# Patient Record
Sex: FEMALE | Race: OTHER | Employment: FULL TIME | URBAN - METROPOLITAN AREA
[De-identification: names, ages, dates, MRNs, and addresses within clinical notes are randomized per-mention and may not be internally consistent; named-entity substitution may affect disease eponyms.]

---

## 2017-01-25 ENCOUNTER — TELEPHONE (OUTPATIENT)
Dept: INTERNAL MEDICINE CLINIC | Facility: CLINIC | Age: 32
End: 2017-01-25

## 2017-01-25 NOTE — TELEPHONE ENCOUNTER
Pt made appt via my chart on 02/01 c/o anxiety please advise. Patient Demographics      Address Phone E-mail Address     [address] [facility] [place] [address] [phone] [facility]) [email or web address]       Message      Appointment For: Jolynn Mae

## 2017-01-26 ENCOUNTER — OFFICE VISIT (OUTPATIENT)
Dept: OBGYN CLINIC | Facility: CLINIC | Age: 32
End: 2017-01-26

## 2017-01-26 VITALS
DIASTOLIC BLOOD PRESSURE: 76 MMHG | HEART RATE: 87 BPM | SYSTOLIC BLOOD PRESSURE: 123 MMHG | BODY MASS INDEX: 27 KG/M2 | WEIGHT: 157 LBS

## 2017-01-26 DIAGNOSIS — Z01.419 NORMAL PELVIC EXAM: Primary | ICD-10-CM

## 2017-01-26 PROCEDURE — 99213 OFFICE O/P EST LOW 20 MIN: CPT | Performed by: CLINICAL NURSE SPECIALIST

## 2017-01-26 NOTE — PROGRESS NOTES
Ian Simmons is a 33year old female [facility] Patient's last menstrual period was 12/28/2016. Patient presents with:  Gyn Problem: bulge on top of vagina and cyst at bottom  Was inserting NuvaRing and felt a \"fleshy hard bulge in the back of my vagina\".  D HCl (MUCINEX ALLERGY OR), Take by mouth., Disp: , Rfl:   •  Pseudoephedrine-APAP-DM (DAYQUIL OR), Take by mouth., Disp: , Rfl:   •  Etonogestrel-Ethinyl Estradiol (NUVARING) 0.12-0.015 MG/24HR Vaginal Ring, Place 1 each vaginally every 28 days. , Disp: 1 ea completely normal.

## 2017-01-27 NOTE — TELEPHONE ENCOUNTER
She needs to have an office visit before I feel comfortable prescribing any medication. However, please provide her with the contact information for Behavioral Health for appropriate counseling in the meantime.

## 2017-01-27 NOTE — TELEPHONE ENCOUNTER
Spoke to pt. She states that she is going through a divorce. She has been very emotional, lashing out at people, cries frequently. States that she wakes up at night with her heart racing.  She has always had anxiety but the divorce is making it \"ten times

## 2017-02-01 NOTE — PATIENT INSTRUCTIONS
Please begin generic Lexapro 10 mg 1 pill daily. Use alprazolam sparingly for acute anxiety. Return visit in one month for recheck.

## 2017-02-01 NOTE — PROGRESS NOTES
Nikki Nichols is a 33year old female. Patient presents with: Anxiety    HPI:   Ms. Titi Lomelikasia presents this afternoon to discuss medication for anxiety. She states that she has been anxious \"my whole life. \"  Recently, because of life stressors, she has be Depression with anxiety    • Migraines    • Chronic rhinitis    • Genital herpes       Social History:    Smoking Status: Never Smoker                      Smokeless Status: Never Used                        Alcohol Use: Yes           0.0 oz/week       0 S

## 2017-02-03 ENCOUNTER — TELEPHONE (OUTPATIENT)
Dept: FAMILY MEDICINE CLINIC | Facility: CLINIC | Age: 32
End: 2017-02-03

## 2017-02-03 NOTE — TELEPHONE ENCOUNTER
Please advise, Pt stated she took the 10 mg on an empty stomach and got dizzy and nauseated.  Today she took a 1/2 tablet with food and feels better no side effects,asking if ok to take 1/2 dosage

## 2017-02-03 NOTE — TELEPHONE ENCOUNTER
Okay to take generic Lexapro 10 mg one half pill daily for a few days, but I would recommend that she soon try to take a whole pill every day

## 2017-02-03 NOTE — TELEPHONE ENCOUNTER
----- Message from [facility] sent at 2/2/2017  8:18 AM CST -----  Regarding: Prescription Question  Contact: [phone]  Hi I started the lexapro yesterday. I took one this morning and I feel dizzy and nauseous.  Should I cut the pill in half and on

## 2017-05-01 RX ORDER — VALACYCLOVIR HYDROCHLORIDE 500 MG/1
TABLET, FILM COATED ORAL
Qty: 30 TABLET | Refills: 0 | Status: SHIPPED | OUTPATIENT
Start: 2017-05-01 | End: 2017-05-30

## 2017-05-01 NOTE — TELEPHONE ENCOUNTER
Received Valacyclovir rx refill (maintenance dose) request from pt's pharmacy. Okay to refill until next annual is due? Last annual 11/2/16, Normal Pap and negative HPV. Rx last filled 4/2016 (30 tabs with refills for one year).

## 2017-05-25 ENCOUNTER — TELEPHONE (OUTPATIENT)
Dept: INTERNAL MEDICINE CLINIC | Facility: CLINIC | Age: 32
End: 2017-05-25

## 2017-05-25 NOTE — TELEPHONE ENCOUNTER
Actions Requested:  Further recommendation, Pt states she will make appointment to see Dr for area to be evaluated.   Situation/Background   Problem: Skin tag swollen and irritated   Onset: today   Associated Symptoms: red and swollen   History of Same: ski

## 2017-05-26 ENCOUNTER — OFFICE VISIT (OUTPATIENT)
Dept: INTERNAL MEDICINE CLINIC | Facility: CLINIC | Age: 32
End: 2017-05-26

## 2017-05-26 VITALS
SYSTOLIC BLOOD PRESSURE: 120 MMHG | HEART RATE: 82 BPM | RESPIRATION RATE: 18 BRPM | HEIGHT: 64 IN | BODY MASS INDEX: 25.78 KG/M2 | WEIGHT: 151 LBS | TEMPERATURE: 99 F | DIASTOLIC BLOOD PRESSURE: 74 MMHG

## 2017-05-26 DIAGNOSIS — L91.8 SKIN TAG: Primary | ICD-10-CM

## 2017-05-26 DIAGNOSIS — F41.8 DEPRESSION WITH ANXIETY: ICD-10-CM

## 2017-05-26 PROCEDURE — 99213 OFFICE O/P EST LOW 20 MIN: CPT | Performed by: INTERNAL MEDICINE

## 2017-05-26 PROCEDURE — 99212 OFFICE O/P EST SF 10 MIN: CPT | Performed by: INTERNAL MEDICINE

## 2017-05-26 RX ORDER — ALPRAZOLAM 0.5 MG/1
0.5 TABLET ORAL 2 TIMES DAILY PRN
Qty: 20 TABLET | Refills: 0 | Status: SHIPPED | OUTPATIENT
Start: 2017-05-26 | End: 2017-12-27

## 2017-05-26 NOTE — PROGRESS NOTES
Fuad Marques is a 33year old female. Patient presents with:  Skin: Pt state that she has a skin tag under the left axilla  Medication Request: Pend for refill    HPI:   MsChad Audrey Villa presents this afternoon for follow-up.     At her last visit in February she well in no distress  /74 mmHg  Pulse 82  Temp(Src) 98.6 °F (37 °C) (Oral)  Resp 18  Ht 5' 4\" (1.626 m)  Wt 151 lb (68.493 kg)  BMI 25.91 kg/m2  LMP 05/19/2017 (Approximate)  EXTREMITIES: Pedunculated skin tag left axilla with tenderness without disc

## 2017-05-30 NOTE — TELEPHONE ENCOUNTER
Med only refilled x 1 month. (see 4/30 phone call). OK to refill until annual in November?  Med pended

## 2017-05-31 ENCOUNTER — PATIENT MESSAGE (OUTPATIENT)
Dept: INTERNAL MEDICINE CLINIC | Facility: CLINIC | Age: 32
End: 2017-05-31

## 2017-05-31 RX ORDER — VALACYCLOVIR HYDROCHLORIDE 500 MG/1
TABLET, FILM COATED ORAL
Qty: 30 TABLET | Refills: 5 | Status: SHIPPED | OUTPATIENT
Start: 2017-05-31 | End: 2017-11-20

## 2017-06-02 NOTE — TELEPHONE ENCOUNTER
From: Nikki Nichols  To: Amanda Grady MD  Sent: 5/31/2017 8:54 AM CDT  Subject: Other    Hi, can Dr. Escamilla Gain forward over a note I can give my job to get approved for a standing work station?  I would like to alternate between sitting and standing at work f

## 2017-06-02 NOTE — TELEPHONE ENCOUNTER
Okay to provide a letter as requested, but if a medical diagnosis supporting such a request is needed, I am unable to provide a medical diagnosis

## 2017-10-28 DIAGNOSIS — Z30.09 ENCOUNTER FOR COUNSELING REGARDING CONTRACEPTION: ICD-10-CM

## 2017-10-29 DIAGNOSIS — Z30.09 ENCOUNTER FOR COUNSELING REGARDING CONTRACEPTION: ICD-10-CM

## 2017-10-29 RX ORDER — ETONOGESTREL AND ETHINYL ESTRADIOL 11.7; 2.7 MG/1; MG/1
1 INSERT, EXTENDED RELEASE VAGINAL
Qty: 1 EACH | Refills: 11 | Status: CANCELLED
Start: 2017-10-29

## 2017-10-30 ENCOUNTER — PATIENT MESSAGE (OUTPATIENT)
Dept: OBGYN CLINIC | Facility: CLINIC | Age: 32
End: 2017-10-30

## 2017-10-30 DIAGNOSIS — Z30.09 ENCOUNTER FOR COUNSELING REGARDING CONTRACEPTION: ICD-10-CM

## 2017-10-30 RX ORDER — ETONOGESTREL AND ETHINYL ESTRADIOL 11.7; 2.7 MG/1; MG/1
1 INSERT, EXTENDED RELEASE VAGINAL
Qty: 1 EACH | Refills: 0 | Status: SHIPPED | OUTPATIENT
Start: 2017-10-30 | End: 2017-11-20

## 2017-10-30 RX ORDER — ETONOGESTREL/ETHINYL ESTRADIOL .12-.015MG
RING, VAGINAL VAGINAL
Refills: 0 | OUTPATIENT
Start: 2017-10-30

## 2017-10-30 NOTE — TELEPHONE ENCOUNTER
From: Ld Sprague  Sent: 10/29/2017 12:36 PM CDT  Subject: Medication Renewal Request    Abimael Kc.  Corbin Stallings would like a refill of the following medications:     Etonogestrel-Ethinyl Estradiol (NUVARING) 0.12-0.015 MG/24HR Vaginal Ring JULIANA Castro

## 2017-10-30 NOTE — TELEPHONE ENCOUNTER
LAST ANNUAL 11-2-16 (PAP NORMAL) AND NEXT ANNUAL 11-20-17. AUTHORIZATION FOR 1 PACK SENT TO PHARMACY PER PROTOCOL.

## 2017-10-30 NOTE — TELEPHONE ENCOUNTER
From: Kris Solitario  To: DIAMOND Sandoval  Sent: 10/30/2017 4:38 PM CDT  Subject: Prescription Question    Hello,  I have made the appointment for 11/20/17 at 8:40

## 2017-11-20 ENCOUNTER — OFFICE VISIT (OUTPATIENT)
Dept: OBGYN CLINIC | Facility: CLINIC | Age: 32
End: 2017-11-20

## 2017-11-20 VITALS
WEIGHT: 166 LBS | SYSTOLIC BLOOD PRESSURE: 117 MMHG | BODY MASS INDEX: 28 KG/M2 | HEART RATE: 82 BPM | DIASTOLIC BLOOD PRESSURE: 81 MMHG

## 2017-11-20 DIAGNOSIS — Z76.0 MEDICATION REFILL: ICD-10-CM

## 2017-11-20 DIAGNOSIS — Z01.419 WELL WOMAN EXAM WITH ROUTINE GYNECOLOGICAL EXAM: Primary | ICD-10-CM

## 2017-11-20 PROCEDURE — 99395 PREV VISIT EST AGE 18-39: CPT | Performed by: CLINICAL NURSE SPECIALIST

## 2017-11-20 RX ORDER — ETONOGESTREL AND ETHINYL ESTRADIOL 11.7; 2.7 MG/1; MG/1
1 INSERT, EXTENDED RELEASE VAGINAL
Qty: 1 EACH | Refills: 12 | Status: SHIPPED | OUTPATIENT
Start: 2017-11-20 | End: 2018-10-01

## 2017-11-20 RX ORDER — VALACYCLOVIR HYDROCHLORIDE 500 MG/1
500 TABLET, FILM COATED ORAL
Qty: 30 TABLET | Refills: 5 | Status: SHIPPED | OUTPATIENT
Start: 2017-11-20 | End: 2018-06-10

## 2017-11-20 RX ORDER — GLYCERIN/POLYCARBOPHL/CARBOMER
1 GEL WITH PREFILLED APPLICATOR (GRAM) VAGINAL DAILY
Qty: 30 CAPSULE | Refills: 6 | Status: SHIPPED | OUTPATIENT
Start: 2017-11-20 | End: 2018-11-13

## 2017-11-20 NOTE — PROGRESS NOTES
Sam Childress is a 33year old female [facility] Patient's last menstrual period was 10/25/2017. Patient presents with:  Gyn Exam: Annual  Medication Request: refill on Nuvaring, would also like a rx for Refresh. Last annual exam and pap was 11/2/16.  Pap was daily., Disp: 30 capsule, Rfl: 6  •  Etonogestrel-Ethinyl Estradiol (NUVARING) 0.12-0.015 MG/24HR Vaginal Ring, Place 1 each vaginally every 28 days. , Disp: 1 each, Rfl: 12  •  ValACYclovir HCl 500 MG Oral Tab, Take 1 tablet (500 mg total) by mouth once da situation.  Appropriate mood and affect    Pelvic Exam:  External Genitalia: normal appearance, hair distribution, and no lesions  Urethral Meatus:  normal in size, location, without lesions and prolapse  Bladder:  No fullness, masses or tenderness  Vagina:

## 2017-11-21 ENCOUNTER — TELEPHONE (OUTPATIENT)
Dept: OBGYN CLINIC | Facility: CLINIC | Age: 32
End: 2017-11-21

## 2017-11-21 NOTE — TELEPHONE ENCOUNTER
Received fax from [employer] as if OK to substitute pts rephresh pro-B capsules for other probiotics. Message to [facility] to please advise. Form placed in black bin in old triage room.

## 2017-11-22 NOTE — TELEPHONE ENCOUNTER
This is up to the pt. .if she is ok with a different probiotic, its fine with me. She is using her [employer] account to pay for this so she may want rephresh only.      MAF

## 2017-11-27 NOTE — TELEPHONE ENCOUNTER
Pt states that she wants the rephresh pro-b capsules capsules, Informed pharmacist Jose G that pt wants that filled instead of a substitute. Pharmacist stated understanding.

## 2017-11-28 RX ORDER — VALACYCLOVIR HYDROCHLORIDE 500 MG/1
TABLET, FILM COATED ORAL
Qty: 30 TABLET | Refills: 0 | OUTPATIENT
Start: 2017-11-28

## 2017-12-28 RX ORDER — ALPRAZOLAM 0.5 MG/1
0.5 TABLET ORAL 2 TIMES DAILY PRN
Qty: 20 TABLET | Refills: 0 | OUTPATIENT
Start: 2017-12-28 | End: 2018-06-06

## 2017-12-28 NOTE — TELEPHONE ENCOUNTER
From: Cuauhtemoc Brito  Sent: 12/27/2017 9:03 PM CST  Subject: Medication Renewal Request    Deysi Mcclain.  Joseph Mccartney would like a refill of the following medications:     ALPRAZolam 0.5 MG Oral Tab Cynthia Bui MD]    Preferred pharmacy: [place] [phone] -

## 2017-12-28 NOTE — TELEPHONE ENCOUNTER
Please respond to pool: EM [facility] LPN/CMA    Please advise on refill request.    Recent Outpatient Visits            1 month ago Well woman exam with routine gynecological exam    [facility], [address], [place], [place], [place]

## 2018-01-07 DIAGNOSIS — Z30.09 ENCOUNTER FOR COUNSELING REGARDING CONTRACEPTION: ICD-10-CM

## 2018-01-08 RX ORDER — ETONOGESTREL/ETHINYL ESTRADIOL .12-.015MG
RING, VAGINAL VAGINAL
Refills: 0 | OUTPATIENT
Start: 2018-01-08

## 2018-02-03 DIAGNOSIS — Z76.0 MEDICATION REFILL: ICD-10-CM

## 2018-02-03 RX ORDER — ETONOGESTREL AND ETHINYL ESTRADIOL VAGINAL .015; .12 MG/D; MG/D
1 RING VAGINAL
Qty: 1 EACH | Refills: 12 | Status: CANCELLED
Start: 2018-02-03

## 2018-04-03 ENCOUNTER — HOSPITAL ENCOUNTER (OUTPATIENT)
Age: 33
Discharge: HOME | End: 2018-04-03
Attending: FAMILY MEDICINE
Payer: COMMERCIAL

## 2018-04-03 VITALS
OXYGEN SATURATION: 97 % | HEART RATE: 116 BPM | SYSTOLIC BLOOD PRESSURE: 131 MMHG | TEMPERATURE: 100 F | DIASTOLIC BLOOD PRESSURE: 83 MMHG | RESPIRATION RATE: 18 BRPM

## 2018-04-03 DIAGNOSIS — J01.90 ACUTE SINUSITIS, RECURRENCE NOT SPECIFIED, UNSPECIFIED LOCATION: Primary | ICD-10-CM

## 2018-04-03 DIAGNOSIS — J02.9 ACUTE PHARYNGITIS, UNSPECIFIED ETIOLOGY: ICD-10-CM

## 2018-04-03 PROCEDURE — 87430 STREP A AG IA: CPT | Performed by: FAMILY MEDICINE

## 2018-04-03 PROCEDURE — 99204 OFFICE O/P NEW MOD 45 MIN: CPT

## 2018-04-03 PROCEDURE — 87081 CULTURE SCREEN ONLY: CPT | Performed by: FAMILY MEDICINE

## 2018-04-03 RX ORDER — AMOXICILLIN AND CLAVULANATE POTASSIUM 875; 125 MG/1; MG/1
1 TABLET, FILM COATED ORAL 2 TIMES DAILY
Qty: 14 TABLET | Refills: 0 | Status: SHIPPED | OUTPATIENT
Start: 2018-04-03 | End: 2018-04-10

## 2018-04-03 NOTE — ED INITIAL ASSESSMENT (HPI)
Pt c/o sore throat for 2 days. Pt states she has chills and a cough. Pt states she has sinus drainage.

## 2018-04-03 NOTE — ED PROVIDER NOTES
Patient Seen in: [facility]    History   Patient presents with:  Sore Throat    Stated Complaint: sore throat, fever, congestion x 1 week    HPI    36-year-old female presented with chief complaint of sinus congestion, sinu Neck supple. Cardiovascular: Regular rhythm and normal heart sounds. Tachycardia present. Pulmonary/Chest: Effort normal and breath sounds normal. No respiratory distress. She has no wheezes. She has no rales.    Lymphadenopathy:     She has no cervic

## 2018-04-17 ENCOUNTER — HOSPITAL ENCOUNTER (OUTPATIENT)
Age: 33
Discharge: HOME | End: 2018-04-17
Attending: FAMILY MEDICINE
Payer: COMMERCIAL

## 2018-04-17 VITALS
BODY MASS INDEX: 28.17 KG/M2 | TEMPERATURE: 98 F | HEART RATE: 90 BPM | RESPIRATION RATE: 18 BRPM | DIASTOLIC BLOOD PRESSURE: 89 MMHG | OXYGEN SATURATION: 97 % | HEIGHT: 64 IN | WEIGHT: 165 LBS | SYSTOLIC BLOOD PRESSURE: 142 MMHG

## 2018-04-17 DIAGNOSIS — H10.33 ACUTE CONJUNCTIVITIS OF BOTH EYES, UNSPECIFIED ACUTE CONJUNCTIVITIS TYPE: Primary | ICD-10-CM

## 2018-04-17 DIAGNOSIS — R09.81 SINUS CONGESTION: ICD-10-CM

## 2018-04-17 PROCEDURE — 99214 OFFICE O/P EST MOD 30 MIN: CPT

## 2018-04-17 PROCEDURE — 99213 OFFICE O/P EST LOW 20 MIN: CPT

## 2018-04-17 RX ORDER — TOBRAMYCIN 3 MG/ML
2 SOLUTION/ DROPS OPHTHALMIC EVERY 6 HOURS
Qty: 1 BOTTLE | Refills: 0 | Status: SHIPPED | OUTPATIENT
Start: 2018-04-17 | End: 2018-04-24

## 2018-04-17 RX ORDER — PREDNISONE 20 MG/1
40 TABLET ORAL DAILY
Qty: 10 TABLET | Refills: 0 | Status: SHIPPED | OUTPATIENT
Start: 2018-04-17 | End: 2018-04-22

## 2018-04-17 NOTE — ED PROVIDER NOTES
Patient Seen in: [facility]    History   Patient presents with:   Eye Visual Problem (opthalmic)    Stated Complaint: eye irritation x3 days    HPI    21-year-old female presents to the immediate care today with chief compla 90  Resp: 18  Temp: 98.2 °F (36.8 °C)  Temp src: Temporal  SpO2: 97 %  O2 Device: None (Room air)    Current:/89   Pulse 90   Temp 98.2 °F (36.8 °C) (Temporal)   Resp 18   Ht 162.6 cm (5' 4\")   Wt 74.8 kg   LMP 03/24/2018   SpO2 97%   BMI 28.32 kg/m conjunctivitis type  (primary encounter diagnosis)  Sinus congestion    Disposition:  Discharge  4/17/2018  8:30 am    Follow-up:  Kishore King MD  [facility]  [phone]    In 2 days  For re-check        Medications Pres

## 2018-04-17 NOTE — ED INITIAL ASSESSMENT (HPI)
Pt arrived alert and responsive. Pt c/o drainage from both eyes. Pt states she was dx with a sinus infection 2 weeks ago. Pt states she continues and nasal congestion.

## 2018-06-06 RX ORDER — ALPRAZOLAM 0.5 MG/1
0.5 TABLET ORAL 2 TIMES DAILY PRN
Qty: 20 TABLET | Refills: 0 | OUTPATIENT
Start: 2018-06-06 | End: 2018-10-24

## 2018-06-06 NOTE — TELEPHONE ENCOUNTER
From: Fabby Thompson  Sent: 6/6/2018 4:58 PM CDT  Subject: Medication Renewal Request    Freddy Reyes.  Loan Carrasco would like a refill of the following medications:     ALPRAZolam 0.5 MG Oral Tab Domonique Perkins MD]    [place]erred pharmacy[place] [facility] [id number] [place] [address]

## 2018-06-06 NOTE — TELEPHONE ENCOUNTER
Please advise on refill request. Last script 12/28/17    Please respond to pool: [facility] LPN/CMA    Refill Protocol Appointment Criteria  · Appointment scheduled in the past 6 months or in the next 3 months  Recent Outpatient Visits            6 months a

## 2018-06-07 NOTE — TELEPHONE ENCOUNTER
Rx for Alprazolam 0.5mg called in to pharmacy (left VM), per [facility]'s order. Pt notified via [address]

## 2018-06-10 DIAGNOSIS — Z76.0 MEDICATION REFILL: ICD-10-CM

## 2018-06-11 DIAGNOSIS — Z76.0 MEDICATION REFILL: ICD-10-CM

## 2018-06-11 NOTE — TELEPHONE ENCOUNTER
PT WAS SEEN FOR ANNUAL 11-20-17. LAST PAP 11-2-16. RX WAS GIVEN AT THAT TIME FOR #30 WITH 5 REFILLS. RX PENDED TO [facility] FOR #30 WITH 5 REFILLS.

## 2018-06-13 RX ORDER — VALACYCLOVIR HYDROCHLORIDE 500 MG/1
500 TABLET, FILM COATED ORAL
Qty: 30 TABLET | Refills: 5
Start: 2018-06-13

## 2018-06-13 RX ORDER — VALACYCLOVIR HYDROCHLORIDE 500 MG/1
TABLET, FILM COATED ORAL
Qty: 30 TABLET | Refills: 0 | Status: SHIPPED | OUTPATIENT
Start: 2018-06-13 | End: 2018-07-19

## 2018-07-03 ENCOUNTER — HOSPITAL ENCOUNTER (OUTPATIENT)
Age: 33
Discharge: HOME | End: 2018-07-03
Attending: FAMILY MEDICINE
Payer: COMMERCIAL

## 2018-07-03 VITALS
RESPIRATION RATE: 18 BRPM | HEIGHT: 64 IN | WEIGHT: 160 LBS | HEART RATE: 78 BPM | BODY MASS INDEX: 27.31 KG/M2 | SYSTOLIC BLOOD PRESSURE: 121 MMHG | TEMPERATURE: 99 F | DIASTOLIC BLOOD PRESSURE: 78 MMHG | OXYGEN SATURATION: 100 %

## 2018-07-03 DIAGNOSIS — J00 ACUTE RHINITIS: Primary | ICD-10-CM

## 2018-07-03 PROCEDURE — 99214 OFFICE O/P EST MOD 30 MIN: CPT

## 2018-07-03 PROCEDURE — 99213 OFFICE O/P EST LOW 20 MIN: CPT

## 2018-07-03 RX ORDER — METHYLPREDNISOLONE 4 MG/1
TABLET ORAL
Qty: 1 PACKAGE | Refills: 0 | Status: SHIPPED | OUTPATIENT
Start: 2018-07-03 | End: 2018-08-17 | Stop reason: ALTCHOICE

## 2018-07-03 NOTE — ED INITIAL ASSESSMENT (HPI)
Pt c/o nasal congestion, cough, and post nasal drip that improves with Claritin. Pt reports decongestant OTC not working. Denies sore throat, LASHA, fever, chills or other complaints.

## 2018-07-03 NOTE — ED PROVIDER NOTES
Patient Seen in: [facility]    History   Patient presents with:  Cough/URI    Stated Complaint: congestion, x4days    HPI    27-year-old female presented with chief complaint of nasal congestion, sinus pressure, postnasal d Neck: Normal range of motion. Neck supple. Cardiovascular: Normal rate, regular rhythm and normal heart sounds. Pulmonary/Chest: Effort normal and breath sounds normal. No respiratory distress. She has no wheezes. She has no rales.    Lymphadenopathy

## 2018-07-04 NOTE — ED NOTES
Received a Smart ER:  \"The steroids are making my face flush. I'm not as congested/stuffy anymore. Can I stop taking them? I'm on day 2 now. \"  Chart reviewed with Dr. Delmer Mai.   MD stated that she can either stop the Medrol dosepack or she can take 2 tabl

## 2018-07-04 NOTE — ED NOTES
Patient called back and she stated that her cheeks feels hot and does not think she is having an allergic reaction. She stated her breathing is easy and unlabored. Stated that she will stop taking the Medrol and will f/u with her PCP.

## 2018-07-19 DIAGNOSIS — Z76.0 MEDICATION REFILL: ICD-10-CM

## 2018-07-19 RX ORDER — VALACYCLOVIR HYDROCHLORIDE 500 MG/1
TABLET, FILM COATED ORAL
Qty: 30 TABLET | Refills: 4 | Status: SHIPPED | OUTPATIENT
Start: 2018-07-19 | End: 2018-11-29

## 2018-07-19 NOTE — TELEPHONE ENCOUNTER
LAST ANNUAL 11-20-17 AND LAST PAP 11-2-16. #30 WITH 5 REFILLS WAS SENT ON 11-20-17 AND THEN #30 WITH NO REFILLS WAS SENT ON 6-13-18. RX PENDED TO [facility] FOR #30 WITH 4 REFILLS.

## 2018-08-15 ENCOUNTER — PATIENT MESSAGE (OUTPATIENT)
Dept: INTERNAL MEDICINE CLINIC | Facility: CLINIC | Age: 33
End: 2018-08-15

## 2018-08-16 ENCOUNTER — NURSE TRIAGE (OUTPATIENT)
Dept: INTERNAL MEDICINE CLINIC | Facility: CLINIC | Age: 33
End: 2018-08-16

## 2018-08-16 NOTE — TELEPHONE ENCOUNTER
Action Requested: Summary for Provider     []  Critical Lab, Recommendations Needed  [] Need Additional Advice  [x]   FYI    []   Need Orders  [] Need Medications Sent to Pharmacy  []  Other     SUMMARY: Dr Refugio Rizo, please note.  Scheduled to see you tomorrow

## 2018-08-16 NOTE — TELEPHONE ENCOUNTER
August 15, 2018   Jo-Ann Brizuela   to Yg Garcia MD           1:49 PM   Hello, can I get a referral to a GI doctor, please? I have had pain when going to the bathroom and have a visible hemmroid I need looked at.      Yelitza Peterson

## 2018-08-16 NOTE — TELEPHONE ENCOUNTER
From: Fuad Marques  To: Joann Liao MD  Sent: 8/15/2018 1:49 PM CDT  Subject: Other    Hello, can I get a referral to a GI doctor, please? I have had pain when going to the bathroom and have a visible hemmroid I need looked at.     Noni Zheng

## 2018-08-17 ENCOUNTER — OFFICE VISIT (OUTPATIENT)
Dept: INTERNAL MEDICINE CLINIC | Facility: CLINIC | Age: 33
End: 2018-08-17

## 2018-08-17 ENCOUNTER — APPOINTMENT (OUTPATIENT)
Dept: LAB | Facility: HOSPITAL | Age: 33
End: 2018-08-17
Attending: INTERNAL MEDICINE
Payer: COMMERCIAL

## 2018-08-17 VITALS
DIASTOLIC BLOOD PRESSURE: 81 MMHG | WEIGHT: 172 LBS | HEART RATE: 99 BPM | SYSTOLIC BLOOD PRESSURE: 128 MMHG | HEIGHT: 64 IN | BODY MASS INDEX: 29.37 KG/M2

## 2018-08-17 DIAGNOSIS — K62.89 RECTAL PAIN: ICD-10-CM

## 2018-08-17 DIAGNOSIS — K62.89 RECTAL PAIN: Primary | ICD-10-CM

## 2018-08-17 LAB
ERYTHROCYTE [DISTWIDTH] IN BLOOD BY AUTOMATED COUNT: 13.1 % (ref 11–15)
HCT VFR BLD AUTO: 41 % (ref 35–48)
HGB BLD-MCNC: 13.8 G/DL (ref 12–16)
MCH RBC QN AUTO: 28.7 PG (ref 27–32)
MCHC RBC AUTO-ENTMCNC: 33.7 G/DL (ref 32–37)
MCV RBC AUTO: 85 FL (ref 80–100)
PLATELET # BLD AUTO: 348 K/UL (ref 140–400)
PMV BLD AUTO: 9.1 FL (ref 7.4–10.3)
RBC # BLD AUTO: 4.83 M/UL (ref 3.7–5.4)
WBC # BLD AUTO: 8.6 K/UL (ref 4–11)

## 2018-08-17 PROCEDURE — 99213 OFFICE O/P EST LOW 20 MIN: CPT | Performed by: INTERNAL MEDICINE

## 2018-08-17 PROCEDURE — 99212 OFFICE O/P EST SF 10 MIN: CPT | Performed by: INTERNAL MEDICINE

## 2018-08-17 PROCEDURE — 36415 COLL VENOUS BLD VENIPUNCTURE: CPT

## 2018-08-17 PROCEDURE — 85027 COMPLETE CBC AUTOMATED: CPT

## 2018-08-17 NOTE — PATIENT INSTRUCTIONS
Await results of CBC. Please use a sitz bath and Anusol suppositories twice daily. Call if no better for a referral to GI.

## 2018-08-17 NOTE — PROGRESS NOTES
Michelle Nunes is a 29year old female. Patient presents with:  Blood In Stool    HPI:   For the past 6 months, she has had daily symptoms of rectal pain and bleeding.   She notices rectal pain almost every time she has a bowel movement, along with relatively History:  01/30/2016: OTHER SURGICAL HISTORY      Comment: widom teeth   Social History:  Smoking status: Never Smoker                                                              Smokeless tobacco: Never Used                      Alcohol use:  Yes

## 2018-08-21 NOTE — H&P
[facility] [address] Gastroenterology                                                                                                  Clinic History and Physical     Pa at [place]. She was told she may have a anal fissure and was instructed to keep her bowel movements soft. She reports the rectal exam at that time was very uncomfortable.   She was on an Atkins diet which she believes may have contributed to her sympto by mouth 2 (two) times daily as needed. Disp: 20 tablet Rfl: 0   Lactobacillus (REPHRESH PRO-B) Oral Cap Take 1 capsule by mouth daily.  Disp: 30 capsule Rfl: 6   Etonogestrel-Ethinyl Estradiol (NUVARING) 0.12-0.015 MG/24HR Vaginal Ring Place 1 each vaginal per patient preference  : no CVA tenderness  Skin: dry, warm, no jaundice  Ext: no cyanosis, clubbing or edema is evident.    Neuro: Alert and oriented x4, and patient is having movements of all 4 extremities   Psych: Pt has a normal mood and affect, beha continued observation. Recommend:  See above          Orders This Visit:  No orders of the defined types were placed in this encounter.       Meds This Visit:    No prescriptions requested or ordered in this encounter    Imaging & Referrals:  None

## 2018-08-23 ENCOUNTER — PATIENT MESSAGE (OUTPATIENT)
Dept: INTERNAL MEDICINE CLINIC | Facility: CLINIC | Age: 33
End: 2018-08-23

## 2018-08-23 NOTE — TELEPHONE ENCOUNTER
From: Sari Noland  To: Rohan Jaeger MD  Sent: 8/23/2018 7:39 AM CDT  Subject: Visit Follow-up Question    Hello, I visited Dr. Jessica Archer on Friday about having pain when using the bathroom. He uses has since worsened.  I have an appointment with the GI on T

## 2018-08-23 NOTE — TELEPHONE ENCOUNTER
OK to provide note as requested. Off work until seen by GI Tuesday. Further work restrictions per GI.

## 2018-08-28 ENCOUNTER — OFFICE VISIT (OUTPATIENT)
Dept: GASTROENTEROLOGY | Facility: CLINIC | Age: 33
End: 2018-08-28

## 2018-08-28 VITALS
DIASTOLIC BLOOD PRESSURE: 75 MMHG | SYSTOLIC BLOOD PRESSURE: 111 MMHG | BODY MASS INDEX: 29.3 KG/M2 | WEIGHT: 171.63 LBS | HEIGHT: 64 IN | HEART RATE: 73 BPM

## 2018-08-28 DIAGNOSIS — K62.89 RECTAL DISCOMFORT: ICD-10-CM

## 2018-08-28 DIAGNOSIS — K62.5 RECTAL BLEEDING: Primary | ICD-10-CM

## 2018-08-28 PROCEDURE — 99243 OFF/OP CNSLTJ NEW/EST LOW 30: CPT | Performed by: NURSE PRACTITIONER

## 2018-08-28 PROCEDURE — 99212 OFFICE O/P EST SF 10 MIN: CPT | Performed by: NURSE PRACTITIONER

## 2018-09-10 DIAGNOSIS — Z76.0 MEDICATION REFILL: ICD-10-CM

## 2018-09-10 RX ORDER — ETONOGESTREL AND ETHINYL ESTRADIOL VAGINAL .015; .12 MG/D; MG/D
1 RING VAGINAL
Qty: 1 EACH | Refills: 12
Start: 2018-09-10

## 2018-09-10 NOTE — TELEPHONE ENCOUNTER
LAST ANNUAL 11-20-17, LAST PAP 11-2-16 AND NEXT ANNUAL 11-7-18. SENT BACK RX DENIED AND NOTED RX SENT TO THEIR STORE ON 11-20-17 WAS FOR 1 RING WITH 12 REFILLS. SHOULD STILL HAVE REFILLS AVAILABLE.

## 2018-10-01 ENCOUNTER — PATIENT MESSAGE (OUTPATIENT)
Dept: OBGYN CLINIC | Facility: CLINIC | Age: 33
End: 2018-10-01

## 2018-10-01 DIAGNOSIS — Z76.0 MEDICATION REFILL: ICD-10-CM

## 2018-10-01 RX ORDER — ETONOGESTREL AND ETHINYL ESTRADIOL VAGINAL .015; .12 MG/D; MG/D
1 RING VAGINAL
Qty: 1 EACH | Refills: 12 | Status: CANCELLED | OUTPATIENT
Start: 2018-10-01

## 2018-10-01 RX ORDER — ETONOGESTREL AND ETHINYL ESTRADIOL 11.7; 2.7 MG/1; MG/1
1 INSERT, EXTENDED RELEASE VAGINAL
Qty: 1 EACH | Refills: 2 | Status: SHIPPED | OUTPATIENT
Start: 2018-10-01 | End: 2018-11-29

## 2018-10-01 NOTE — TELEPHONE ENCOUNTER
LAST ANNUAL 11-20-17 AND LAST PAP 11-2-16. AUTHORIZATION FOR 3 REFILLS SENT TO THE PHARMACY PER PROTOCOL. I ALSO NOTIFIED PT TO RESCHEDULE HER APPT UNTIL AFTER 11-20-17 SO THERE WON'T BE ANY INSURANCE ISSUES.

## 2018-10-01 NOTE — TELEPHONE ENCOUNTER
From: Tylor Hazel  To: DIAMOND Shin  Sent: 10/1/2018 10:31 AM CDT  Subject: Prescription Question    Can I please have 2 Nuva rings? I need one for 10/10 and one for 10/31 so I can skip my period. I have my appointment on 11/7 with Rohan White.    Thank

## 2018-10-25 RX ORDER — ALPRAZOLAM 0.5 MG/1
0.5 TABLET ORAL 2 TIMES DAILY PRN
Qty: 20 TABLET | Refills: 0 | OUTPATIENT
Start: 2018-10-25 | End: 2019-03-27

## 2018-10-25 NOTE — TELEPHONE ENCOUNTER
Rx Alprazolam phoned into [address] spoke to [facility] pharmacist.

## 2018-10-25 NOTE — TELEPHONE ENCOUNTER
Controlled medication pending for review. If approved needs to be called in or faxed by on-site staff.      Please respond to pool: EM [facility] LPN/[facility]    Requested Prescriptions     Pending Prescriptions Disp Refills   • ALPRAZolam 0.5 MG Oral Tab 20 tablet

## 2018-11-13 ENCOUNTER — PATIENT MESSAGE (OUTPATIENT)
Dept: OBGYN CLINIC | Facility: CLINIC | Age: 33
End: 2018-11-13

## 2018-11-13 ENCOUNTER — OFFICE VISIT (OUTPATIENT)
Dept: OBGYN CLINIC | Facility: CLINIC | Age: 33
End: 2018-11-13

## 2018-11-13 ENCOUNTER — TELEPHONE (OUTPATIENT)
Dept: OBGYN CLINIC | Facility: CLINIC | Age: 33
End: 2018-11-13

## 2018-11-13 VITALS
SYSTOLIC BLOOD PRESSURE: 132 MMHG | HEART RATE: 81 BPM | WEIGHT: 174.63 LBS | DIASTOLIC BLOOD PRESSURE: 84 MMHG | BODY MASS INDEX: 30 KG/M2

## 2018-11-13 DIAGNOSIS — Z11.3 SCREEN FOR STD (SEXUALLY TRANSMITTED DISEASE): ICD-10-CM

## 2018-11-13 DIAGNOSIS — L29.0 ANAL ITCHING: Primary | ICD-10-CM

## 2018-11-13 PROCEDURE — 99213 OFFICE O/P EST LOW 20 MIN: CPT | Performed by: OBSTETRICS & GYNECOLOGY

## 2018-11-13 NOTE — H&P
HPI:  The patient is a 28 yo F c/o 10 years of anal itching. Concerned for anal gonorrhea. Saw PCP and GI. Plans for cscope soon. Takes stool softner and has bleeding if constipated.  Has had anal sex w/o condom in the past.  Had neg gc/ct testing 2 year Male        Birth control/protection: Inserts        Comment: nuva ring    Other Topics      Concerns:         Service: Not Asked        Blood Transfusions: Not Asked        Caffeine Concern: Yes          coffee, 1 cup        Occupational Exposure: vaginal discharge, vaginal itching  Musculoskeletal:  denies back pain. Skin/Breast:  Denies any breast pain, lumps, or discharge. Neurological:  denies headaches, extremity weakness  Psychiatric: denies depression or anxiety.        11/13/18  1597   BP:

## 2018-11-13 NOTE — TELEPHONE ENCOUNTER
PT IS CONCERNED THAT SHE MAY HAVE GONORRHEA FOR 10 YEARS. I ASSURED HER SHE WAS CHECKED 3 YEARS AGO AND WAS NEGATIVE. HAS SOME PAIN WITH URINATION AND ALSO HAS RECTAL BLEEDING. SAW GI AND WAS TOLD SHE HAS HEMORRHOIDS AND ALONG WITH CHRONIC CONSTIPATION IS CAUSING THE RECTAL BLEEDING. PT IS VERY NERVOUS AND WANTS TO BE SEEN TODAY TO BE SURE SHE DOES NOT HAVE GONORRHEA, INCLUDING IN THE RECTUM.

## 2018-11-13 NOTE — TELEPHONE ENCOUNTER
From: Laly Montero  To: DIAMOND Mcmahon  Sent: 11/13/2018 5:46 AM CST  Subject: Test Results Question    Hi, can you please tell me what was tested here exactly? I want to retest at my next appointment with Darryle Falter since it's been about 3 years.  Thank

## 2018-11-23 ENCOUNTER — TELEPHONE (OUTPATIENT)
Dept: OBGYN CLINIC | Facility: CLINIC | Age: 33
End: 2018-11-23

## 2018-11-23 NOTE — TELEPHONE ENCOUNTER
Informed pt that [facility] to notify her of the results. JULIETTE  stated trich and Chlamydia/Gc were negative.

## 2018-11-29 ENCOUNTER — OFFICE VISIT (OUTPATIENT)
Dept: OBGYN CLINIC | Facility: CLINIC | Age: 33
End: 2018-11-29

## 2018-11-29 VITALS
BODY MASS INDEX: 29.84 KG/M2 | HEIGHT: 64 IN | WEIGHT: 174.81 LBS | HEART RATE: 77 BPM | SYSTOLIC BLOOD PRESSURE: 111 MMHG | DIASTOLIC BLOOD PRESSURE: 76 MMHG

## 2018-11-29 DIAGNOSIS — Z12.4 SCREENING FOR MALIGNANT NEOPLASM OF CERVIX: ICD-10-CM

## 2018-11-29 DIAGNOSIS — Z76.0 MEDICATION REFILL: ICD-10-CM

## 2018-11-29 DIAGNOSIS — Z01.419 WELL WOMAN EXAM WITH ROUTINE GYNECOLOGICAL EXAM: Primary | ICD-10-CM

## 2018-11-29 PROCEDURE — 99395 PREV VISIT EST AGE 18-39: CPT | Performed by: CLINICAL NURSE SPECIALIST

## 2018-11-29 RX ORDER — VALACYCLOVIR HYDROCHLORIDE 500 MG/1
TABLET, FILM COATED ORAL
Qty: 30 TABLET | Refills: 4 | Status: SHIPPED | OUTPATIENT
Start: 2018-11-29 | End: 2019-11-25

## 2018-11-29 RX ORDER — ETONOGESTREL AND ETHINYL ESTRADIOL 11.7; 2.7 MG/1; MG/1
1 INSERT, EXTENDED RELEASE VAGINAL
Qty: 3 EACH | Refills: 4 | Status: SHIPPED | OUTPATIENT
Start: 2018-11-29 | End: 2019-10-28

## 2018-11-30 NOTE — PROGRESS NOTES
Brina Walsh is a 29year old female [place] Patient's last menstrual period was 11/25/2018. Patient presents with:  Gyn Exam: annual exam  Last annual exam was last year. Last pap was 2016 and normal, HPV negative. Denies hx of abnormal pap's.  Periods are use: No      Sexual activity: Yes        Partners: Male        Birth control/protection: Inserts        Comment: nuva ring    Other Topics      Concerns:         Service: Not Asked        Blood Transfusions: Not Asked        Caffeine Concern:  Yes dysuria, incontinence, abnormal vaginal discharge, vaginal itching  Musculoskeletal:  denies back pain. Skin/Breast:  Denies any breast pain, lumps, or discharge. Neurological:  denies headaches, extremity weakness or numbness.   Psychiatric: denies depr THINPREP PAP SMEAR ONLY; Future  -     THINPREP PAP SMEAR ONLY      Pap done at pts request. New ASSCP guidelines reviewed in detail. Annual exams encouraged. SBE encouraged. Mammograms once 41.   Refill Rx for NuvaRing and Valtrex sent

## 2019-02-12 ENCOUNTER — PATIENT MESSAGE (OUTPATIENT)
Dept: INTERNAL MEDICINE CLINIC | Facility: CLINIC | Age: 34
End: 2019-02-12

## 2019-02-12 NOTE — TELEPHONE ENCOUNTER
From: Judit Dacosta  To: Cynthia Sahu MD  Sent: 2/12/2019 7:42 AM CST  Subject: Non-Urgent Medical Question    Hello, is there a way for me to get my vaccinations history?

## 2019-03-02 DIAGNOSIS — Z76.0 MEDICATION REFILL: ICD-10-CM

## 2019-03-02 RX ORDER — ETONOGESTREL/ETHINYL ESTRADIOL .12-.015MG
RING, VAGINAL VAGINAL
Refills: 0 | OUTPATIENT
Start: 2019-03-02

## 2019-03-05 RX ORDER — ESCITALOPRAM OXALATE 10 MG/1
TABLET ORAL
Qty: 30 TABLET | Refills: 0 | OUTPATIENT
Start: 2019-03-05

## 2019-03-28 ENCOUNTER — PATIENT MESSAGE (OUTPATIENT)
Dept: INTERNAL MEDICINE CLINIC | Facility: CLINIC | Age: 34
End: 2019-03-28

## 2019-03-28 RX ORDER — ALPRAZOLAM 0.5 MG/1
0.5 TABLET ORAL 2 TIMES DAILY PRN
Qty: 20 TABLET | Refills: 0 | OUTPATIENT
Start: 2019-03-28 | End: 2019-09-03

## 2019-03-29 ENCOUNTER — TELEPHONE (OUTPATIENT)
Dept: OTHER | Age: 34
End: 2019-03-29

## 2019-03-29 NOTE — TELEPHONE ENCOUNTER
From: Al Mohan  To: Austen Ritter MD  Sent: 3/28/2019 5:57 PM CDT  Subject: Prescription Question    Can you please tell dr Ziyad Echols that I have been having problems sleeping. I toss and turn and can't shut down my brain.  It's not every single night, bu

## 2019-03-29 NOTE — TELEPHONE ENCOUNTER
Pt advised OV- no recent Appt- stated she will make appt on [employer]   Regarding: Prescription Question  Contact: [phone]  ----- Message from [employer] sent at 3/28/2019  5:57 PM CDT -----    Can you please tell dr Ziyad Echols that I have been having p

## 2019-06-03 ENCOUNTER — OFFICE VISIT (OUTPATIENT)
Dept: INTERNAL MEDICINE CLINIC | Facility: CLINIC | Age: 34
End: 2019-06-03

## 2019-06-03 VITALS
DIASTOLIC BLOOD PRESSURE: 78 MMHG | HEIGHT: 64 IN | WEIGHT: 177 LBS | BODY MASS INDEX: 30.22 KG/M2 | SYSTOLIC BLOOD PRESSURE: 119 MMHG | HEART RATE: 79 BPM

## 2019-06-03 DIAGNOSIS — M54.50 ACUTE LEFT-SIDED LOW BACK PAIN WITHOUT SCIATICA: Primary | ICD-10-CM

## 2019-06-03 PROCEDURE — 99212 OFFICE O/P EST SF 10 MIN: CPT | Performed by: INTERNAL MEDICINE

## 2019-06-03 PROCEDURE — 99213 OFFICE O/P EST LOW 20 MIN: CPT | Performed by: INTERNAL MEDICINE

## 2019-06-03 RX ORDER — CYCLOBENZAPRINE HCL 10 MG
10 TABLET ORAL 3 TIMES DAILY PRN
Qty: 30 TABLET | Refills: 0 | Status: SHIPPED | OUTPATIENT
Start: 2019-06-03 | End: 2019-06-12

## 2019-06-03 RX ORDER — NAPROXEN 500 MG/1
500 TABLET ORAL 2 TIMES DAILY WITH MEALS
Qty: 30 TABLET | Refills: 0 | Status: SHIPPED | OUTPATIENT
Start: 2019-06-03 | End: 2019-06-12 | Stop reason: ALTCHOICE

## 2019-06-03 NOTE — PROGRESS NOTES
Pierre Burnette is a 36year old female. Patient presents with:  Low Back Pain    HPI:   About 2 weeks ago she was doing boot camp exercises and was flipping a tire when she injured her lower back and developed left-sided low back pain.   Back pain gradually i History    Tobacco Use      Smoking status: Never Smoker      Smokeless tobacco: Never Used    Alcohol use:  Yes      Alcohol/week: 0.0 oz      Comment: socailly    Drug use: No         EXAM:   GENERAL: Pleasant female appearing well in no distress, moving

## 2019-06-03 NOTE — PATIENT INSTRUCTIONS
Please rest your back, avoiding painful activities, and apply heat 2-3 times daily. Perform gentle stretching exercises. Take naproxen 500 mg twice daily with meals. Take cyclobenzaprine 10 mg 3 times daily as needed, watching for drowsiness.   Call if n

## 2019-06-11 ENCOUNTER — PATIENT MESSAGE (OUTPATIENT)
Dept: INTERNAL MEDICINE CLINIC | Facility: CLINIC | Age: 34
End: 2019-06-11

## 2019-06-11 NOTE — TELEPHONE ENCOUNTER
From: Cuauhtemoc Brito  To: Sussy An MD  Sent: 6/11/2019 6:07 PM CDT  Subject: Visit Follow-up Question    It seemed like my back pain was getting better today.  Went on with my normal day, then the pain came back bad when I was putting some clothes away

## 2019-06-12 ENCOUNTER — TELEPHONE (OUTPATIENT)
Dept: OTHER | Age: 34
End: 2019-06-12

## 2019-06-12 ENCOUNTER — OFFICE VISIT (OUTPATIENT)
Dept: INTERNAL MEDICINE CLINIC | Facility: CLINIC | Age: 34
End: 2019-06-12

## 2019-06-12 ENCOUNTER — PATIENT MESSAGE (OUTPATIENT)
Dept: INTERNAL MEDICINE CLINIC | Facility: CLINIC | Age: 34
End: 2019-06-12

## 2019-06-12 VITALS
DIASTOLIC BLOOD PRESSURE: 87 MMHG | BODY MASS INDEX: 29.37 KG/M2 | SYSTOLIC BLOOD PRESSURE: 121 MMHG | HEIGHT: 64 IN | HEART RATE: 93 BPM | WEIGHT: 172 LBS

## 2019-06-12 DIAGNOSIS — M54.50 ACUTE BILATERAL LOW BACK PAIN WITHOUT SCIATICA: Primary | ICD-10-CM

## 2019-06-12 PROCEDURE — 99212 OFFICE O/P EST SF 10 MIN: CPT | Performed by: INTERNAL MEDICINE

## 2019-06-12 PROCEDURE — 99213 OFFICE O/P EST LOW 20 MIN: CPT | Performed by: INTERNAL MEDICINE

## 2019-06-12 RX ORDER — NABUMETONE 750 MG/1
750 TABLET, FILM COATED ORAL 2 TIMES DAILY WITH MEALS
Qty: 30 TABLET | Refills: 0 | Status: SHIPPED | OUTPATIENT
Start: 2019-06-12 | End: 2020-09-11

## 2019-06-12 RX ORDER — CYCLOBENZAPRINE HCL 10 MG
10 TABLET ORAL 3 TIMES DAILY PRN
Qty: 30 TABLET | Refills: 0 | Status: SHIPPED | OUTPATIENT
Start: 2019-06-12 | End: 2019-06-20

## 2019-06-12 RX ORDER — TRAMADOL HYDROCHLORIDE 50 MG/1
50 TABLET ORAL 2 TIMES DAILY PRN
Qty: 10 TABLET | Refills: 0 | OUTPATIENT
Start: 2019-06-12 | End: 2020-09-11

## 2019-06-12 NOTE — PROGRESS NOTES
Ld Sprague is a 36year old female. Patient presents with:  Low Back Pain    HPI:   After her recent visit earlier this month, lower back pain was improving. She was taking naproxen and cyclobenzaprine, and applying heat.   Yesterday however while savin Tobacco Use      Smoking status: Never Smoker      Smokeless tobacco: Never Used    Alcohol use:  Yes      Alcohol/week: 0.0 oz      Comment: socailly    Drug use: No         EXAM:   GENERAL: Pleasant female appearing well in no distress, moving comfortably

## 2019-06-12 NOTE — TELEPHONE ENCOUNTER
DR Au=tramadol prescription pended, please sign, this nurse already phoned in the prescription. RN/office staff=no need top phone in the rx, already phoned in today. [employer] message sent to patient that medication already phoned in.     Rufus wit

## 2019-06-12 NOTE — PATIENT INSTRUCTIONS
Continue to rest your back and apply heat 2-3 times daily. Stop naproxen and begin nabumetone 750 mg twice daily with meals. Continue cyclobenzaprine 10 mg 3 times daily as needed. Use tramadol 50 mg twice daily as needed for severe pain.   Avoid alprazo

## 2019-06-13 NOTE — TELEPHONE ENCOUNTER
From: Paris Newton  To: Cheng Sutton MD  Sent: 6/12/2019 5:41 PM CDT  Subject: Visit Follow-up Question    I forgot to ask Dr Jocelynn Lino for a letter for a stand up desk at work? ?      Thanks

## 2019-06-20 RX ORDER — CYCLOBENZAPRINE HCL 10 MG
10 TABLET ORAL 3 TIMES DAILY PRN
Qty: 30 TABLET | Refills: 0 | OUTPATIENT
Start: 2019-06-20 | End: 2019-07-10

## 2019-06-21 RX ORDER — CYCLOBENZAPRINE HCL 10 MG
10 TABLET ORAL 3 TIMES DAILY PRN
Qty: 30 TABLET | Refills: 0 | Status: SHIPPED | OUTPATIENT
Start: 2019-06-21 | End: 2019-07-11

## 2019-06-21 NOTE — TELEPHONE ENCOUNTER
Review pended refill request as it does not fall under a protocol.     Requested Prescriptions     Pending Prescriptions Disp Refills   • Cyclobenzaprine HCl 10 MG Oral Tab 30 tablet 0     Sig: Take 1 tablet (10 mg total) by mouth 3 (three) times daily as n

## 2019-09-04 RX ORDER — ALPRAZOLAM 0.5 MG/1
0.5 TABLET ORAL 2 TIMES DAILY PRN
Qty: 20 TABLET | Refills: 0 | OUTPATIENT
Start: 2019-09-04 | End: 2019-11-29

## 2019-09-04 NOTE — TELEPHONE ENCOUNTER
Okay to refill alprazolam once. [facility] prescription drug monitoring website reviewed. Please phone in.

## 2019-10-27 DIAGNOSIS — Z76.0 MEDICATION REFILL: ICD-10-CM

## 2019-10-27 RX ORDER — ETONOGESTREL AND ETHINYL ESTRADIOL VAGINAL .015; .12 MG/D; MG/D
1 RING VAGINAL
Qty: 3 EACH | Refills: 4 | Status: CANCELLED | OUTPATIENT
Start: 2019-10-27

## 2019-10-28 ENCOUNTER — PATIENT MESSAGE (OUTPATIENT)
Dept: OBGYN CLINIC | Facility: CLINIC | Age: 34
End: 2019-10-28

## 2019-10-28 DIAGNOSIS — Z76.0 MEDICATION REFILL: ICD-10-CM

## 2019-10-28 RX ORDER — VALACYCLOVIR HYDROCHLORIDE 500 MG/1
TABLET, FILM COATED ORAL
Qty: 30 TABLET | Refills: 0 | OUTPATIENT
Start: 2019-10-28

## 2019-10-28 RX ORDER — ETONOGESTREL AND ETHINYL ESTRADIOL 11.7; 2.7 MG/1; MG/1
1 INSERT, EXTENDED RELEASE VAGINAL
Qty: 2 EACH | Refills: 0 | Status: SHIPPED | OUTPATIENT
Start: 2019-10-28 | End: 2020-01-06

## 2019-10-28 NOTE — TELEPHONE ENCOUNTER
From: Mireille Finch  To: ROBERT Cordova  Sent: 10/28/2019 11:36 AM CDT  Subject: Prescription Question    I please need 2 nuva rings before my yearly 11/25 appointment.

## 2019-10-28 NOTE — TELEPHONE ENCOUNTER
Nuvaring rx sent for 2 rings to cover pt to next annual on 11/25. Last annual was on 11/29/18, Normal Pap.

## 2019-10-30 DIAGNOSIS — Z76.0 MEDICATION REFILL: ICD-10-CM

## 2019-10-30 RX ORDER — VALACYCLOVIR HYDROCHLORIDE 500 MG/1
TABLET, FILM COATED ORAL
Qty: 30 TABLET | Refills: 4 | OUTPATIENT
Start: 2019-10-30

## 2019-11-25 DIAGNOSIS — Z76.0 MEDICATION REFILL: ICD-10-CM

## 2019-11-25 RX ORDER — VALACYCLOVIR HYDROCHLORIDE 500 MG/1
TABLET, FILM COATED ORAL
Qty: 30 TABLET | Refills: 0 | Status: SHIPPED | OUTPATIENT
Start: 2019-11-25 | End: 2020-01-06

## 2019-11-25 NOTE — TELEPHONE ENCOUNTER
LAST ANNUAL 11-29-18 (PAP NORMAL) AND NEXT ANNUAL 12-9-19. LAST REFILL WAS ON 11-29-18 FOR #30 WITH 4 REFILLS AND TO TAKE 1 TAB AS NEEDED. OK TO REFILL? IF SO, HOW MANY?

## 2019-11-30 RX ORDER — ALPRAZOLAM 0.5 MG/1
0.5 TABLET ORAL 2 TIMES DAILY PRN
Qty: 20 TABLET | Refills: 0 | OUTPATIENT
Start: 2019-11-30 | End: 2020-03-23

## 2019-11-30 NOTE — TELEPHONE ENCOUNTER
Controlled medication pending for review. If approved needs to be called in or faxed by on-site staff.      Last Rx: 9/4/19  LOV: 4 months ago

## 2020-01-06 ENCOUNTER — OFFICE VISIT (OUTPATIENT)
Dept: OBGYN CLINIC | Facility: CLINIC | Age: 35
End: 2020-01-06

## 2020-01-06 VITALS
DIASTOLIC BLOOD PRESSURE: 82 MMHG | HEART RATE: 99 BPM | BODY MASS INDEX: 30.39 KG/M2 | SYSTOLIC BLOOD PRESSURE: 135 MMHG | WEIGHT: 178 LBS | HEIGHT: 64 IN

## 2020-01-06 DIAGNOSIS — Z76.0 MEDICATION REFILL: ICD-10-CM

## 2020-01-06 DIAGNOSIS — Z01.419 WELL WOMAN EXAM WITH ROUTINE GYNECOLOGICAL EXAM: Primary | ICD-10-CM

## 2020-01-06 PROCEDURE — 99395 PREV VISIT EST AGE 18-39: CPT | Performed by: CLINICAL NURSE SPECIALIST

## 2020-01-06 RX ORDER — ETONOGESTREL AND ETHINYL ESTRADIOL 11.7; 2.7 MG/1; MG/1
1 INSERT, EXTENDED RELEASE VAGINAL
Qty: 3 EACH | Refills: 4 | Status: SHIPPED | OUTPATIENT
Start: 2020-01-06 | End: 2020-01-27

## 2020-01-06 RX ORDER — VALACYCLOVIR HYDROCHLORIDE 500 MG/1
TABLET, FILM COATED ORAL
Qty: 30 TABLET | Refills: 2 | Status: SHIPPED | OUTPATIENT
Start: 2020-01-06

## 2020-01-06 NOTE — PROGRESS NOTES
Hudson Queen is a 30year old female [facility] Patient's last menstrual period was 12/18/2019. Patient presents with:  Gyn Exam: annul exam  Medication Request: Tj Burns  Last annual exam and pap was last year. Pap was normal. Denies hx of abnormal pap's.  Pe Male        Birth control/protection: Inserts        Comment: nuva ring    Lifestyle      Physical activity:        Days per week: Not on file        Minutes per session: Not on file      Stress: Not on file    Relationships      Social connections: needed. , Disp: 20 tablet, Rfl: 0  •  traMADol HCl 50 MG Oral Tab, Take 1 tablet (50 mg total) by mouth 2 (two) times daily as needed for Pain., Disp: 10 tablet, Rfl: 0  •  Nabumetone 750 MG Oral Tab, Take 1 tablet (750 mg total) by mouth 2 (two) times marily Oriented to time, place, person and situation.  Appropriate mood and affect    Pelvic Exam:  External Genitalia: normal appearance, hair distribution, and no lesions  Urethral Meatus:  normal in size, location, without lesions and prolapse  Bladder:  No ful

## 2020-01-26 DIAGNOSIS — Z76.0 MEDICATION REFILL: ICD-10-CM

## 2020-01-27 RX ORDER — ETONOGESTREL/ETHINYL ESTRADIOL .12-.015MG
RING, VAGINAL VAGINAL
Qty: 3 RING | Refills: 3 | Status: SHIPPED | OUTPATIENT
Start: 2020-01-27 | End: 2021-03-03

## 2020-03-23 RX ORDER — ALPRAZOLAM 0.5 MG/1
0.5 TABLET ORAL 2 TIMES DAILY PRN
Qty: 20 TABLET | Refills: 0 | Status: SHIPPED | OUTPATIENT
Start: 2020-03-23 | End: 2020-04-28

## 2020-04-28 DIAGNOSIS — Z76.0 MEDICATION REFILL: ICD-10-CM

## 2020-04-28 RX ORDER — VALACYCLOVIR HYDROCHLORIDE 500 MG/1
TABLET, FILM COATED ORAL
Qty: 30 TABLET | Refills: 2 | OUTPATIENT
Start: 2020-04-28

## 2020-04-28 RX ORDER — ALPRAZOLAM 0.5 MG/1
0.5 TABLET ORAL 2 TIMES DAILY PRN
Qty: 20 TABLET | Refills: 0 | OUTPATIENT
Start: 2020-04-28 | End: 2020-05-21

## 2020-05-21 RX ORDER — ALPRAZOLAM 0.5 MG/1
0.5 TABLET ORAL 2 TIMES DAILY PRN
Qty: 20 TABLET | Refills: 0 | OUTPATIENT
Start: 2020-05-21 | End: 2020-05-24

## 2020-05-25 RX ORDER — ALPRAZOLAM 0.5 MG/1
0.5 TABLET ORAL 2 TIMES DAILY PRN
Qty: 20 TABLET | Refills: 0 | OUTPATIENT
Start: 2020-05-25 | End: 2020-06-09

## 2020-05-26 RX ORDER — ALPRAZOLAM 0.5 MG/1
TABLET ORAL
Qty: 20 TABLET | Refills: 0 | OUTPATIENT
Start: 2020-05-26

## 2020-06-10 RX ORDER — ALPRAZOLAM 0.5 MG/1
0.5 TABLET ORAL 2 TIMES DAILY PRN
Qty: 20 TABLET | Refills: 0 | OUTPATIENT
Start: 2020-06-10 | End: 2020-09-11

## 2020-06-10 NOTE — TELEPHONE ENCOUNTER
Review pended refill request as it does not fall under a protocol.     Last Rx: 05/25/20  LOV: 06/12/19

## 2020-06-26 ENCOUNTER — PATIENT MESSAGE (OUTPATIENT)
Dept: INTERNAL MEDICINE CLINIC | Facility: CLINIC | Age: 35
End: 2020-06-26

## 2020-06-26 NOTE — TELEPHONE ENCOUNTER
From: Leo Lobo  To: Pasty Cooks, MD  Sent: 6/26/2020 10:47 AM CDT  Subject: Non-Urgent Medical Question    This is not urgent. I would like to please know if you have records of what blood type I am? Thank you!

## 2020-07-16 ENCOUNTER — PATIENT MESSAGE (OUTPATIENT)
Dept: OBGYN CLINIC | Facility: CLINIC | Age: 35
End: 2020-07-16

## 2020-07-16 DIAGNOSIS — Z76.0 MEDICATION REFILL: ICD-10-CM

## 2020-07-16 RX ORDER — ETONOGESTREL AND ETHINYL ESTRADIOL 11.7; 2.7 MG/1; MG/1
1 INSERT, EXTENDED RELEASE VAGINAL
Qty: 3 RING | Refills: 3 | OUTPATIENT
Start: 2020-07-16

## 2020-07-16 RX ORDER — ETONOGESTREL/ETHINYL ESTRADIOL .12-.015MG
RING, VAGINAL VAGINAL
Refills: 0 | OUTPATIENT
Start: 2020-07-16

## 2020-07-16 NOTE — TELEPHONE ENCOUNTER
Last annual - 1/6/2020  Last pap - 11/29/2018, normal    On 1/6/2020, pt given 3 rings with 3 refills. Rx denied. Pt should have refills.

## 2020-07-16 NOTE — TELEPHONE ENCOUNTER
Last annual - 1/6/2020. Pt was given 3 rings with 3 refills at that visit. Rx denied. Pt has refills.

## 2020-07-16 NOTE — TELEPHONE ENCOUNTER
From: Ld Sprague  To: ROBERT Montana  Sent: 7/16/2020 6:20 PM CDT  Subject: Prescription Question    I please need more nuva rings. I do two months straight of nuva rings to skip my period every other month. The pharmacy has no more refills.  Thank mariza

## 2020-09-03 ENCOUNTER — NURSE TRIAGE (OUTPATIENT)
Dept: INTERNAL MEDICINE CLINIC | Facility: CLINIC | Age: 35
End: 2020-09-03

## 2020-09-03 NOTE — TELEPHONE ENCOUNTER
Action Requested: Summary for Provider     []  Critical Lab, Recommendations Needed  [] Need Additional Advice  []   FYI    []   Need Orders  [] Need Medications Sent to Pharmacy  []  Other     SUMMARY: Patient scheduled appt 9/9 with Dr Isabell Ruiz for symptoms

## 2020-09-03 NOTE — TELEPHONE ENCOUNTER
Pt scheduled appt via Android [employer] stating Tingling fingers and pulsing arm please advise appt is 9/9

## 2020-09-11 NOTE — PROGRESS NOTES
Jo-Ann Brizuela is a 30year old female here today for a telemedicine/video visit. HPI:   Please note that the following visit was completed using two-way, real-time interactive audio and/or video communication.   This has been done in good [place] to provide has been busy and otherwise feeling well. She does complain of occasional numbness in all fingers of both hands and wonders about carpal tunnel. She has had no other specific symptoms.   Discussed with her that she has not had a physical or labs in quite able.      The patient indicates understanding of these issues and agrees to the plan.   The patient is asked to return soon for a physical.    Néstor Gil MD  9/11/2020  10:32 AM

## 2020-09-11 NOTE — PATIENT INSTRUCTIONS
Use alprazolam occasionally for anxiety. I sent a refill to your pharmacy. Please get a flu shot soon. Schedule a physical with me when you can.

## 2020-09-14 ENCOUNTER — PATIENT MESSAGE (OUTPATIENT)
Dept: INTERNAL MEDICINE CLINIC | Facility: CLINIC | Age: 35
End: 2020-09-14

## 2020-09-14 RX ORDER — ALPRAZOLAM 0.5 MG/1
0.5 TABLET ORAL 2 TIMES DAILY PRN
Qty: 20 TABLET | Refills: 0 | Status: SHIPPED | OUTPATIENT
Start: 2020-09-14 | End: 2020-12-16

## 2020-09-14 NOTE — TELEPHONE ENCOUNTER
From: Michelle Nunes  To: Sam Perez MD  Sent: 9/14/2020 3:18 PM CDT  Subject: Prescription Question    Would it be possible to transfer my last prescription to the [facility] on [address] [place] [address] in [place], [place], please?  I no longer work onsite and the [facility]

## 2020-09-25 ENCOUNTER — PATIENT MESSAGE (OUTPATIENT)
Dept: INTERNAL MEDICINE CLINIC | Facility: CLINIC | Age: 35
End: 2020-09-25

## 2020-09-25 NOTE — TELEPHONE ENCOUNTER
From: Pierre Burnette  To: Jackson Mcdaniel MD  Sent: 9/25/2020 5:35 AM CDT  Subject: Prescription Question    Hi I was wondering if my Xanax prescription can please be sent to [facility] on [address] [facility] in [address] [place] [address].  I never picked up from [employer]

## 2020-10-16 ENCOUNTER — OFFICE VISIT (OUTPATIENT)
Dept: INTERNAL MEDICINE CLINIC | Facility: CLINIC | Age: 35
End: 2020-10-16

## 2020-10-16 VITALS
HEIGHT: 64 IN | SYSTOLIC BLOOD PRESSURE: 112 MMHG | BODY MASS INDEX: 30.71 KG/M2 | WEIGHT: 179.88 LBS | HEART RATE: 106 BPM | DIASTOLIC BLOOD PRESSURE: 76 MMHG

## 2020-10-16 DIAGNOSIS — Z00.00 ANNUAL PHYSICAL EXAM: Primary | ICD-10-CM

## 2020-10-16 PROCEDURE — 90686 IIV4 VACC NO PRSV 0.5 ML IM: CPT | Performed by: INTERNAL MEDICINE

## 2020-10-16 PROCEDURE — 90715 TDAP VACCINE 7 YRS/> IM: CPT | Performed by: INTERNAL MEDICINE

## 2020-10-16 PROCEDURE — 90471 IMMUNIZATION ADMIN: CPT | Performed by: INTERNAL MEDICINE

## 2020-10-16 PROCEDURE — 99395 PREV VISIT EST AGE 18-39: CPT | Performed by: INTERNAL MEDICINE

## 2020-10-16 PROCEDURE — 90472 IMMUNIZATION ADMIN EACH ADD: CPT | Performed by: INTERNAL MEDICINE

## 2020-10-16 PROCEDURE — 3074F SYST BP LT 130 MM HG: CPT | Performed by: INTERNAL MEDICINE

## 2020-10-16 PROCEDURE — 3008F BODY MASS INDEX DOCD: CPT | Performed by: INTERNAL MEDICINE

## 2020-10-16 PROCEDURE — 3078F DIAST BP <80 MM HG: CPT | Performed by: INTERNAL MEDICINE

## 2020-10-16 RX ORDER — IBUPROFEN 200 MG
200 TABLET ORAL EVERY 6 HOURS PRN
COMMUNITY

## 2020-10-16 NOTE — PATIENT INSTRUCTIONS
Your physical today was normal.  You received a Tdap vaccine, good for 10 years, and a flu shot today. Please schedule blood tests soon when you can. Continue regular exercise and try to follow a healthy diet.

## 2020-10-16 NOTE — H&P
Sari Noland is a 30year old female who presents for a complete physical exam.  HPI:   She has not had a physical in quite some time. In general she feels well and she has no specific issues for discussion today.     Past medical history remarkable for Maternal Cousin       Social History:   Social History    Tobacco Use      Smoking status: Never Smoker      Smokeless tobacco: Never Used    Alcohol use:  Yes      Alcohol/week: 0.0 standard drinks      Comment: Occasional    Drug use: Never         REVIEW

## 2020-11-01 ENCOUNTER — LAB ENCOUNTER (OUTPATIENT)
Dept: LAB | Facility: HOSPITAL | Age: 35
End: 2020-11-01
Attending: INTERNAL MEDICINE
Payer: COMMERCIAL

## 2020-11-01 DIAGNOSIS — Z00.00 ANNUAL PHYSICAL EXAM: ICD-10-CM

## 2020-11-01 PROCEDURE — 84443 ASSAY THYROID STIM HORMONE: CPT

## 2020-11-01 PROCEDURE — 36415 COLL VENOUS BLD VENIPUNCTURE: CPT

## 2020-11-01 PROCEDURE — 80053 COMPREHEN METABOLIC PANEL: CPT

## 2020-11-01 PROCEDURE — 80061 LIPID PANEL: CPT

## 2020-11-01 PROCEDURE — 85027 COMPLETE CBC AUTOMATED: CPT

## 2020-12-16 RX ORDER — ALPRAZOLAM 0.5 MG/1
0.5 TABLET ORAL 2 TIMES DAILY PRN
Qty: 20 TABLET | Refills: 0 | Status: SHIPPED | OUTPATIENT
Start: 2020-12-16 | End: 2021-03-06

## 2021-03-06 RX ORDER — ALPRAZOLAM 0.5 MG/1
0.5 TABLET ORAL 2 TIMES DAILY PRN
Qty: 20 TABLET | Refills: 0 | Status: SHIPPED | OUTPATIENT
Start: 2021-03-06 | End: 2021-08-07

## 2021-06-02 ENCOUNTER — PATIENT MESSAGE (OUTPATIENT)
Dept: OBGYN CLINIC | Facility: CLINIC | Age: 36
End: 2021-06-02

## 2021-06-02 DIAGNOSIS — Z76.0 MEDICATION REFILL: ICD-10-CM

## 2021-06-02 RX ORDER — ETONOGESTREL AND ETHINYL ESTRADIOL 11.7; 2.7 MG/1; MG/1
1 INSERT, EXTENDED RELEASE VAGINAL
Qty: 1 RING | Refills: 1 | Status: SHIPPED | OUTPATIENT
Start: 2021-06-02 | End: 2021-08-04

## 2021-06-02 NOTE — TELEPHONE ENCOUNTER
From: Judit Dacosta  To:  ROBERT Allen  Sent: 6/2/2021 2:53 PM CDT  Subject: Prescription Question    I actually please need 2 before my aug 4th appointment

## 2021-06-02 NOTE — TELEPHONE ENCOUNTER
From: Sho Fowler  To: ROBERT Juan  Sent: 6/2/2021 2:45 PM CDT  Subject: Prescription Question    Hello I had to cancel my appointment for tomorrow. May I please have a nuva ring until my next appointment?

## 2021-06-12 ENCOUNTER — PATIENT MESSAGE (OUTPATIENT)
Dept: OBGYN CLINIC | Facility: CLINIC | Age: 36
End: 2021-06-12

## 2021-06-13 ENCOUNTER — PATIENT MESSAGE (OUTPATIENT)
Dept: OBGYN CLINIC | Facility: CLINIC | Age: 36
End: 2021-06-13

## 2021-06-14 NOTE — TELEPHONE ENCOUNTER
Called pharmacy and the pharmacist stated that Tiffanie Pierce is covered, but pt can not pick the Tiffanie Pierce until 6/23. Informed pt of the above. Informed pt that KIMBERLY has given enough refills and she needs to be seen for further. Sent to [facility] as a FYI.

## 2021-06-14 NOTE — TELEPHONE ENCOUNTER
There is a generic version of the ring now and this may be why insurance wont cover.  If she is ok with generic and an Rx has already been sent, please call pharmacy and give the ok to substitute with generic unless she wants to pay out of pocket for [facility]

## 2021-06-14 NOTE — TELEPHONE ENCOUNTER
From: Hillary Guillen  To: ROBERT Sherman  Sent: 6/13/2021 12:34 PM CDT  Subject: Prescription Question    The pharmacy needs my nuva ring prescription renewed. Can you please help me?  I would so appreciate it and thank you so much!!!

## 2021-06-14 NOTE — TELEPHONE ENCOUNTER
Last annual - 1/6/2020  Last pap - 11/29/2018, normal    Next annual - 8/4/2021    Pt cancelled annual appts on 1/7, 2/8, 3/3, 4/1, 5/3, and 6/3 and she no showed for annual on 4/28.   Pt was given refills of 1 ring on 3/3 and 5/3 and a refill of 1 ring wit

## 2021-06-14 NOTE — TELEPHONE ENCOUNTER
See note below. Pharmacy stated pt can pick Nuvaring on 6/23. Pt is suppose to start sooner. Your recs?

## 2021-06-14 NOTE — TELEPHONE ENCOUNTER
From: Hillary Guillen  To: ROBERT Sherman  Sent: 6/12/2021 2:47 PM CDT  Subject: Prescription Question    Hello my insurance is not covering 2 nuva rings. Can you please help? Thank you.    Xiomara Oneal

## 2021-06-15 RX ORDER — ETONOGESTREL AND ETHINYL ESTRADIOL 11.7; 2.7 MG/1; MG/1
INSERT, EXTENDED RELEASE VAGINAL
Qty: 1 RING | Refills: 0 | Status: SHIPPED | OUTPATIENT
Start: 2021-06-15 | End: 2021-08-04

## 2021-06-15 NOTE — TELEPHONE ENCOUNTER
That's a week into her next cycle, which means its a wasted cycle as far as pregnancy prevention so she needs to use condoms as well for a month and she may very well have some irregular bleeding since she is off a week.  If this is ok with her, yes she can

## 2021-06-21 ENCOUNTER — PATIENT MESSAGE (OUTPATIENT)
Dept: OBGYN CLINIC | Facility: CLINIC | Age: 36
End: 2021-06-21

## 2021-06-21 NOTE — TELEPHONE ENCOUNTER
From: Kris Solitario  To: ROBERT Huff  Sent: 6/21/2021 2:08 PM CDT  Subject: Prescription Question    Sorry that I keep messaging about the nuva ring prescription. I paid $126.99 for the nuva ring so I don't fall out of schedule.  However I only g

## 2021-08-04 ENCOUNTER — OFFICE VISIT (OUTPATIENT)
Dept: OBGYN CLINIC | Facility: CLINIC | Age: 36
End: 2021-08-04

## 2021-08-04 VITALS
DIASTOLIC BLOOD PRESSURE: 85 MMHG | BODY MASS INDEX: 33 KG/M2 | WEIGHT: 190.81 LBS | SYSTOLIC BLOOD PRESSURE: 128 MMHG | HEART RATE: 94 BPM

## 2021-08-04 DIAGNOSIS — Z12.4 CERVICAL CANCER SCREENING: ICD-10-CM

## 2021-08-04 DIAGNOSIS — Z76.0 MEDICATION REFILL: ICD-10-CM

## 2021-08-04 DIAGNOSIS — Z01.419 WELL WOMAN EXAM WITH ROUTINE GYNECOLOGICAL EXAM: Primary | ICD-10-CM

## 2021-08-04 PROCEDURE — 3074F SYST BP LT 130 MM HG: CPT | Performed by: CLINICAL NURSE SPECIALIST

## 2021-08-04 PROCEDURE — 99395 PREV VISIT EST AGE 18-39: CPT | Performed by: CLINICAL NURSE SPECIALIST

## 2021-08-04 PROCEDURE — 3079F DIAST BP 80-89 MM HG: CPT | Performed by: CLINICAL NURSE SPECIALIST

## 2021-08-04 RX ORDER — ETONOGESTREL AND ETHINYL ESTRADIOL 11.7; 2.7 MG/1; MG/1
1 INSERT, EXTENDED RELEASE VAGINAL
Qty: 3 RING | Refills: 4 | Status: SHIPPED | OUTPATIENT
Start: 2021-08-04

## 2021-08-05 LAB — HPV I/H RISK 1 DNA SPEC QL NAA+PROBE: NEGATIVE

## 2021-08-07 RX ORDER — ALPRAZOLAM 0.5 MG/1
0.5 TABLET ORAL 2 TIMES DAILY PRN
Qty: 20 TABLET | Refills: 0 | Status: SHIPPED | OUTPATIENT
Start: 2021-08-07

## 2021-08-08 NOTE — PROGRESS NOTES
Michelle Nunes is a 29year old female [place] Patient's last menstrual period was 07/12/2021. Patient presents with:  Gyn Exam: Annual exam  Last annual exam was last year. Last Pap smear was 2016 and was normal, HPV negative. We will do cotesting today. Concern: Not Asked        Weight Concern: Not Asked        Special Diet: Not Asked        Back Care: Not Asked        Exercise: Not Asked        Bike Helmet: Not Asked        Seat Belt: Not Asked        Self-Exams: Not Asked    Social History Narrative daily as needed. , Disp: 20 tablet, Rfl: 0    ALLERGIES:  No Known Allergies      Review of Systems:  Constitutional:  Denies fatigue, night sweats, hot flashes  Eyes:  denies blurred or double vision  Cardiovascular:  denies chest pain or palpitations  Res normal  Anus: no hemorroids     Assessment & Plan:  Dima Gomez was seen today for gyn exam.    Diagnoses and all orders for this visit:    Well woman exam with routine gynecological exam    Medication refill  -     Etonogestrel-Ethinyl Estradiol (NUVARING) 0.12-

## 2022-09-23 ENCOUNTER — OFFICE VISIT (OUTPATIENT)
Dept: INTERNAL MEDICINE CLINIC | Facility: CLINIC | Age: 37
End: 2022-09-23

## 2022-09-23 VITALS
HEART RATE: 109 BPM | RESPIRATION RATE: 14 BRPM | WEIGHT: 202 LBS | OXYGEN SATURATION: 97 % | DIASTOLIC BLOOD PRESSURE: 70 MMHG | BODY MASS INDEX: 34.49 KG/M2 | HEIGHT: 64 IN | SYSTOLIC BLOOD PRESSURE: 120 MMHG

## 2022-09-23 DIAGNOSIS — Z00.00 ANNUAL PHYSICAL EXAM: Primary | ICD-10-CM

## 2022-09-23 DIAGNOSIS — E66.9 OBESITY (BMI 30.0-34.9): ICD-10-CM

## 2022-09-23 DIAGNOSIS — F41.8 DEPRESSION WITH ANXIETY: ICD-10-CM

## 2022-09-23 DIAGNOSIS — E78.00 HYPERCHOLESTEROLEMIA: ICD-10-CM

## 2022-09-23 PROCEDURE — 90471 IMMUNIZATION ADMIN: CPT | Performed by: INTERNAL MEDICINE

## 2022-09-23 PROCEDURE — 3074F SYST BP LT 130 MM HG: CPT | Performed by: INTERNAL MEDICINE

## 2022-09-23 PROCEDURE — 90686 IIV4 VACC NO PRSV 0.5 ML IM: CPT | Performed by: INTERNAL MEDICINE

## 2022-09-23 PROCEDURE — 3008F BODY MASS INDEX DOCD: CPT | Performed by: INTERNAL MEDICINE

## 2022-09-23 PROCEDURE — 3078F DIAST BP <80 MM HG: CPT | Performed by: INTERNAL MEDICINE

## 2022-09-23 PROCEDURE — 99395 PREV VISIT EST AGE 18-39: CPT | Performed by: INTERNAL MEDICINE

## 2022-09-23 RX ORDER — CETIRIZINE HYDROCHLORIDE 10 MG/1
TABLET ORAL DAILY
COMMUNITY

## 2022-09-23 NOTE — PATIENT INSTRUCTIONS
Detail Level: Detailed Your blood pressure today was normal at 120/70 and your exam was normal.  You received a flu shot today. Please get a COVID booster soon. Please come in soon for blood tests when you can. Schedule appointments for counseling and Bariatrics. Continue to follow a healthy diet and exercise regularly. Biopsy Photograph Reviewed: Yes Accession # (Optional): [id number] Number Of Curettages: 3 Size Of Lesion In Cm: 0.5 Size Of Lesion After Curettage: 0.6 Add Intralesional Injection: No Concentration (Mg/Ml Or Millions Of Plaque Forming Units/Cc): 0.01 Total Volume (Ccs): 1 Anesthesia Type: 1% lidocaine with epinephrine Cautery Type: electrodesiccation What Was Performed First?: Curettage Additional Information: (Optional): The wound was cleaned, and a pressure dressing was applied.  The patient received detailed post-op instructions. Consent was obtained from the patient. The risks, benefits and alternatives to therapy were discussed in detail. Specifically, the risks of infection, scarring, bleeding, prolonged wound healing, nerve injury, incomplete removal, allergy to anesthesia and recurrence were addressed. Alternatives to ED&C, such as: surgical removal and XRT were also discussed.  Prior to the procedure, the treatment site was clearly identified and confirmed by the patient. All components of Universal Protocol/PAUSE Rule completed. Post-Care Instructions: I reviewed with the patient in detail post-care instructions. Patient is to keep the area dry for 48 hours, and not to engage in any swimming until the area is healed. Should the patient develop any fevers, chills, bleeding, severe pain patient will contact the office immediately. Bill As A Line Item Or As Units: Line Item

## 2022-10-14 ENCOUNTER — OFFICE VISIT (OUTPATIENT)
Dept: OBGYN CLINIC | Facility: CLINIC | Age: 37
End: 2022-10-14
Payer: COMMERCIAL

## 2022-10-14 VITALS
HEART RATE: 109 BPM | DIASTOLIC BLOOD PRESSURE: 82 MMHG | BODY MASS INDEX: 34 KG/M2 | SYSTOLIC BLOOD PRESSURE: 124 MMHG | WEIGHT: 198 LBS

## 2022-10-14 DIAGNOSIS — E66.9 CLASS 1 OBESITY WITH BODY MASS INDEX (BMI) OF 33.0 TO 33.9 IN ADULT, UNSPECIFIED OBESITY TYPE, UNSPECIFIED WHETHER SERIOUS COMORBIDITY PRESENT: ICD-10-CM

## 2022-10-14 DIAGNOSIS — Z30.44 ENCOUNTER FOR SURVEILLANCE OF VAGINAL RING HORMONAL CONTRACEPTIVE DEVICE: ICD-10-CM

## 2022-10-14 DIAGNOSIS — Z01.419 WELL WOMAN EXAM WITH ROUTINE GYNECOLOGICAL EXAM: Primary | ICD-10-CM

## 2022-10-14 DIAGNOSIS — F41.9 ANXIETY: ICD-10-CM

## 2022-11-10 ENCOUNTER — PATIENT MESSAGE (OUTPATIENT)
Dept: OBGYN CLINIC | Facility: CLINIC | Age: 37
End: 2022-11-10

## 2022-11-11 NOTE — TELEPHONE ENCOUNTER
Patient removed NuvaRing 2 days late. Has not had her period yet. She is due to insert new ring today. EMB- okay to insert new ring today to stay on schedule, or would she need to wait 2 additional days?

## 2022-11-11 NOTE — TELEPHONE ENCOUNTER
From: Tremaine Mello  To: ROBERT Gallagher  Sent: 11/10/2022 8:33 PM CST  Subject: Calvin Hopes, I forgot to remove my nuvaring on time and removed it 2 days later. I have not gotten my period and I am due to insert the nuvaring today. How long after I get my period can I insert a new nuvaring?    Thank you

## 2022-11-15 ENCOUNTER — TELEPHONE (OUTPATIENT)
Dept: OBGYN CLINIC | Facility: CLINIC | Age: 37
End: 2022-11-15

## 2022-11-15 DIAGNOSIS — Z76.0 MEDICATION REFILL: ICD-10-CM

## 2022-11-15 RX ORDER — ETONOGESTREL AND ETHINYL ESTRADIOL 11.7; 2.7 MG/1; MG/1
1 INSERT, EXTENDED RELEASE VAGINAL
Qty: 3 RING | Refills: 4 | Status: SHIPPED | OUTPATIENT
Start: 2022-11-15

## 2022-11-15 NOTE — TELEPHONE ENCOUNTER
Pt had annual with EMB on 10/24/22. Notes state Contraception: Using nuvaring, refilled x 1 year. --Encouraged condoms to prevent STD exposure. Received refill request via fax from [facility] for pts nuvaring. RX sent electronically to cover pt until next annual.     Message to EMB for sign off.

## 2022-11-18 ENCOUNTER — PATIENT MESSAGE (OUTPATIENT)
Dept: OBGYN CLINIC | Facility: CLINIC | Age: 37
End: 2022-11-18

## 2022-11-18 NOTE — TELEPHONE ENCOUNTER
Patient missed window to insert Nuvaring, d/t backorder at pharmacy. Period has ended. Would we like her to wait for next cycle to re-start? To EMB to advise.

## 2022-11-18 NOTE — TELEPHONE ENCOUNTER
As soon as she can get the ring, insert it.  Just advise her that if starting ring more than 7 days from start of her menses that the ring will not be effective for 7-10 days and needs to use a back up method for 2 weeks    ROBERT Garcia

## 2023-01-24 ENCOUNTER — HOSPITAL ENCOUNTER (OUTPATIENT)
Age: 38
Discharge: HOME | End: 2023-01-24
Payer: COMMERCIAL

## 2023-01-24 VITALS
HEART RATE: 102 BPM | RESPIRATION RATE: 20 BRPM | OXYGEN SATURATION: 99 % | TEMPERATURE: 98 F | SYSTOLIC BLOOD PRESSURE: 139 MMHG | DIASTOLIC BLOOD PRESSURE: 85 MMHG

## 2023-01-24 DIAGNOSIS — H69.83 ACUTE DYSFUNCTION OF EUSTACHIAN TUBE, BILATERAL: Primary | ICD-10-CM

## 2023-01-24 PROCEDURE — 99203 OFFICE O/P NEW LOW 30 MIN: CPT | Performed by: PHYSICIAN ASSISTANT

## 2023-01-24 RX ORDER — FLUTICASONE PROPIONATE 50 MCG
2 SPRAY, SUSPENSION (ML) NASAL DAILY
Qty: 16 G | Refills: 0 | Status: SHIPPED | OUTPATIENT
Start: 2023-01-24 | End: 2023-02-23

## 2023-01-24 RX ORDER — METHYLPREDNISOLONE 4 MG/1
TABLET ORAL
Qty: 1 EACH | Refills: 0 | Status: SHIPPED | OUTPATIENT
Start: 2023-01-24

## 2023-01-24 NOTE — DISCHARGE INSTRUCTIONS
Flonase nasal spray as directed, as taken before bed elevate head of bed at night with an extra pillow to help prevent postnasal drip    Medrol dose pack to be started tomorrow  with food    Follow up with ENT

## 2023-01-24 NOTE — ED INITIAL ASSESSMENT (HPI)
Pt c/o multiple complaints. Pt c/o bilateral ear stuffiness and drainage from her right ear. Pt also c/o runny nose and post nasal drip. Pt states at times she has difficulty hearing.

## 2023-02-16 ENCOUNTER — HOSPITAL ENCOUNTER (OUTPATIENT)
Age: 38
Discharge: HOME | End: 2023-02-16
Payer: COMMERCIAL

## 2023-02-16 VITALS
HEART RATE: 89 BPM | RESPIRATION RATE: 18 BRPM | DIASTOLIC BLOOD PRESSURE: 99 MMHG | SYSTOLIC BLOOD PRESSURE: 128 MMHG | TEMPERATURE: 98 F | OXYGEN SATURATION: 99 %
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
## 2023-02-16 PROCEDURE — 99213 OFFICE O/P EST LOW 20 MIN: CPT | Performed by: NURSE PRACTITIONER

## 2023-02-16 RX ORDER — AMOXICILLIN AND CLAVULANATE POTASSIUM 875; 125 MG/1; MG/1
1 TABLET, FILM COATED ORAL 2 TIMES DAILY
Qty: 10 TABLET | Refills: 0 | Status: SHIPPED | OUTPATIENT
Start: 2023-02-16 | End: 2023-02-21

## 2023-02-16 RX ORDER — FLUCONAZOLE 150 MG/1
150 TABLET ORAL ONCE
Qty: 2 TABLET | Refills: 0 | Status: SHIPPED | OUTPATIENT
Start: 2023-02-16 | End: 2023-02-16

## 2023-02-16 NOTE — ED INITIAL ASSESSMENT (HPI)
Cat bite and scratch to right hand 30 minutes ago while volunteering at [employer]. Cat was vaccinated for rabies 2 weeks ago. Numbness and tingling to thumb and forearm. Last Tdap was last year per pt.

## 2023-02-21 DIAGNOSIS — Z76.0 MEDICATION REFILL: ICD-10-CM

## 2023-02-21 RX ORDER — ETONOGESTREL AND ETHINYL ESTRADIOL 11.7; 2.7 MG/1; MG/1
1 INSERT, EXTENDED RELEASE VAGINAL
Qty: 3 RING | Refills: 4 | Status: CANCELLED | OUTPATIENT
Start: 2023-02-21

## 2023-02-21 RX ORDER — ALPRAZOLAM 0.5 MG/1
0.5 TABLET ORAL 2 TIMES DAILY PRN
Qty: 20 TABLET | Refills: 0 | Status: SHIPPED | OUTPATIENT
Start: 2023-02-21

## 2023-02-21 NOTE — TELEPHONE ENCOUNTER
Please review refill protocol failed/ no protocol  Requested Prescriptions   Pending Prescriptions Disp Refills    ALPRAZolam 0.5 MG Oral Tab 20 tablet 0     Sig: Take 1 tablet (0.5 mg total) by mouth 2 (two) times daily as needed.        There is no refill protocol information for this order

## 2023-03-28 ENCOUNTER — TELEMEDICINE (OUTPATIENT)
Dept: INTERNAL MEDICINE CLINIC | Facility: CLINIC | Age: 38
End: 2023-03-28

## 2023-03-28 ENCOUNTER — PATIENT MESSAGE (OUTPATIENT)
Dept: INTERNAL MEDICINE CLINIC | Facility: CLINIC | Age: 38
End: 2023-03-28

## 2023-03-28 ENCOUNTER — NURSE TRIAGE (OUTPATIENT)
Dept: INTERNAL MEDICINE CLINIC | Facility: CLINIC | Age: 38
End: 2023-03-28

## 2023-03-28 DIAGNOSIS — J06.9 ACUTE URI: Primary | ICD-10-CM

## 2023-03-28 PROCEDURE — 99213 OFFICE O/P EST LOW 20 MIN: CPT | Performed by: INTERNAL MEDICINE

## 2023-03-28 RX ORDER — FLUTICASONE PROPIONATE 50 MCG
2 SPRAY, SUSPENSION (ML) NASAL DAILY
Qty: 1 EACH | Refills: 1 | Status: SHIPPED | OUTPATIENT
Start: 2023-03-28 | End: 2024-03-22

## 2023-03-28 NOTE — PATIENT INSTRUCTIONS
Please perform a COVID test at home and notify me of results. Use Sudafed as needed for nasal congestion. Use Flonase 2 sprays each nostril daily. Call if not soon better.

## 2023-03-30 ENCOUNTER — HOSPITAL ENCOUNTER (OUTPATIENT)
Age: 38
Discharge: HOME | End: 2023-03-30
Payer: COMMERCIAL

## 2023-03-30 VITALS
SYSTOLIC BLOOD PRESSURE: 124 MMHG | HEIGHT: 64 IN | OXYGEN SATURATION: 98 % | DIASTOLIC BLOOD PRESSURE: 88 MMHG | HEART RATE: 96 BPM | RESPIRATION RATE: 16 BRPM | WEIGHT: 200 LBS | BODY MASS INDEX: 34.15 KG/M2 | TEMPERATURE: 98 F

## 2023-03-30 DIAGNOSIS — J06.9 VIRAL URI: Primary | ICD-10-CM

## 2023-03-30 LAB — SARS-COV-2 RNA RESP QL NAA+PROBE: NOT DETECTED

## 2023-03-30 PROCEDURE — U0002 COVID-19 LAB TEST NON-CDC: HCPCS | Performed by: NURSE PRACTITIONER

## 2023-03-30 PROCEDURE — 99213 OFFICE O/P EST LOW 20 MIN: CPT | Performed by: NURSE PRACTITIONER

## 2023-03-30 RX ORDER — METHYLPREDNISOLONE 4 MG/1
TABLET ORAL
Qty: 1 EACH | Refills: 0 | Status: SHIPPED | OUTPATIENT
Start: 2023-03-30

## 2023-03-30 RX ORDER — BENZONATATE 100 MG/1
100 CAPSULE ORAL 3 TIMES DAILY PRN
Qty: 30 CAPSULE | Refills: 0 | Status: SHIPPED | OUTPATIENT
Start: 2023-03-30 | End: 2023-04-29

## 2023-03-30 NOTE — DISCHARGE INSTRUCTIONS
Your COVID test was negative. Please take Medrol Dosepak and Tessalon Perles as prescribed. Continue Flonase. Over-the-counter antihistamines like Zyrtec. Nasal saline washes as discussed. Follow-up with your primary.

## 2023-05-02 DIAGNOSIS — Z76.0 MEDICATION REFILL: ICD-10-CM

## 2023-05-05 RX ORDER — ETONOGESTREL AND ETHINYL ESTRADIOL 11.7; 2.7 MG/1; MG/1
INSERT, EXTENDED RELEASE VAGINAL
Refills: 0 | OUTPATIENT
Start: 2023-05-05

## 2023-05-05 NOTE — TELEPHONE ENCOUNTER
Last annual - 10/14/2022  Last pap - 8/4/2021, normal    Last rx was sent on 11/15/2022 for 3 rings with 4 refills. Rx denied. Pt should have refills.

## 2023-06-26 ENCOUNTER — PATIENT MESSAGE (OUTPATIENT)
Dept: OBGYN CLINIC | Facility: CLINIC | Age: 38
End: 2023-06-26

## 2023-06-26 DIAGNOSIS — Z76.0 MEDICATION REFILL: ICD-10-CM

## 2023-06-26 RX ORDER — VALACYCLOVIR HYDROCHLORIDE 500 MG/1
500 TABLET, FILM COATED ORAL 2 TIMES DAILY
Qty: 6 TABLET | Refills: 0 | Status: SHIPPED | OUTPATIENT
Start: 2023-06-26 | End: 2023-06-29

## 2023-06-26 RX ORDER — ETONOGESTREL AND ETHINYL ESTRADIOL 11.7; 2.7 MG/1; MG/1
INSERT, EXTENDED RELEASE VAGINAL
Refills: 0 | Status: CANCELLED | OUTPATIENT
Start: 2023-06-26

## 2023-06-27 NOTE — TELEPHONE ENCOUNTER
EMB pt with herpes outbreak. Up to date with annual exam. Message to Lupe Ge  (on call) in EMBs absence (she is not back until wednesday) if OK to refill Valtrex 500mg?

## 2023-06-27 NOTE — TELEPHONE ENCOUNTER
From: Elina Cueva  To: ROBERT Francois  Sent: 6/26/2023 7:26 PM CDT  Subject: Valtrex    Hello, I please need this.  Having an out break

## 2023-08-21 RX ORDER — ALPRAZOLAM 0.5 MG/1
0.5 TABLET ORAL 2 TIMES DAILY PRN
Qty: 20 TABLET | Refills: 0 | Status: SHIPPED | OUTPATIENT
Start: 2023-08-21

## 2023-08-21 NOTE — TELEPHONE ENCOUNTER
Please review. Protocol failed or has no protocol. Requested Prescriptions   Pending Prescriptions Disp Refills    ALPRAZolam 0.5 MG Oral Tab 20 tablet 0     Sig: Take 1 tablet (0.5 mg total) by mouth 2 (two) times daily as needed.        There is no refill protocol information for this order          Recent Outpatient Visits              4 months ago Acute URI    [address], [address] [facility], Bel Shetty MD    Telemedicine    10 months ago Well woman exam with routine gynecological exam    [address], [address] [facility], [address] [place] [address], APR    Office Visit    11 months ago Annual physical exam    Nuria Patel MD    Office Visit    2 years ago Well woman exam with routine gynecological exam    [address], [address] [facility],[address], [address] [facility], APR    Office Visit    2 years ago Annual physical exam    [address], [address] [facility],[address] [facility], Bel Shetty MD    Office Visit            Future Appointments         Provider Department Appt Notes    In 5 months Alice Miranda MD [address], [address], [place] [facility]

## 2023-11-06 ENCOUNTER — HOSPITAL ENCOUNTER (OUTPATIENT)
Age: 38
Discharge: HOME | End: 2023-11-06
Payer: COMMERCIAL

## 2023-11-06 VITALS
TEMPERATURE: 98 F | DIASTOLIC BLOOD PRESSURE: 93 MMHG | HEART RATE: 100 BPM | SYSTOLIC BLOOD PRESSURE: 138 MMHG | RESPIRATION RATE: 18 BRPM | OXYGEN SATURATION: 98 %

## 2023-11-06 DIAGNOSIS — R05.1 ACUTE COUGH: Primary | ICD-10-CM

## 2023-11-06 DIAGNOSIS — J06.9 VIRAL URI WITH COUGH: ICD-10-CM

## 2023-11-06 LAB
S PYO AG THROAT QL: NEGATIVE
SARS-COV-2 RNA RESP QL NAA+PROBE: NOT DETECTED

## 2023-11-06 PROCEDURE — 87880 STREP A ASSAY W/OPTIC: CPT | Performed by: PHYSICIAN ASSISTANT

## 2023-11-06 PROCEDURE — U0002 COVID-19 LAB TEST NON-CDC: HCPCS | Performed by: PHYSICIAN ASSISTANT

## 2023-11-06 PROCEDURE — 99213 OFFICE O/P EST LOW 20 MIN: CPT | Performed by: PHYSICIAN ASSISTANT

## 2023-11-06 RX ORDER — BENZONATATE 200 MG/1
200 CAPSULE ORAL 3 TIMES DAILY PRN
Qty: 20 CAPSULE | Refills: 0 | Status: SHIPPED | OUTPATIENT
Start: 2023-11-06

## 2023-11-06 RX ORDER — FLUTICASONE PROPIONATE 50 MCG
2 SPRAY, SUSPENSION (ML) NASAL DAILY
Qty: 16 G | Refills: 0 | Status: SHIPPED | OUTPATIENT
Start: 2023-11-06 | End: 2023-12-06

## 2023-11-06 RX ORDER — CETIRIZINE HYDROCHLORIDE, PSEUDOEPHEDRINE HYDROCHLORIDE 5; 120 MG/1; MG/1
1 TABLET, FILM COATED, EXTENDED RELEASE ORAL 2 TIMES DAILY PRN
Qty: 20 TABLET | Refills: 0 | Status: SHIPPED | OUTPATIENT
Start: 2023-11-06

## 2023-11-06 NOTE — DISCHARGE INSTRUCTIONS
Take zyrtec dm as directed to help with congestion  Use the nasal spray as instructed - continue use for at least 10 days  Can use cough medications as needed  Follow up with primary care doctor in 48 hours for recheck   Return to the  fi symptoms worsen

## 2023-11-06 NOTE — ED INITIAL ASSESSMENT (HPI)
Pt c/o 5 days of cough. Pt states she has green sputum. Pt also c/o ear discomfort. Pt denies fever.

## 2023-11-13 ENCOUNTER — OFFICE VISIT (OUTPATIENT)
Dept: OBGYN CLINIC | Facility: CLINIC | Age: 38
End: 2023-11-13
Payer: COMMERCIAL

## 2023-11-13 VITALS
SYSTOLIC BLOOD PRESSURE: 125 MMHG | DIASTOLIC BLOOD PRESSURE: 84 MMHG | WEIGHT: 199.63 LBS | HEART RATE: 89 BPM | BODY MASS INDEX: 34 KG/M2

## 2023-11-13 DIAGNOSIS — Z30.44 ENCOUNTER FOR SURVEILLANCE OF VAGINAL RING HORMONAL CONTRACEPTIVE DEVICE: ICD-10-CM

## 2023-11-13 DIAGNOSIS — Z76.0 MEDICATION REFILL: ICD-10-CM

## 2023-11-13 DIAGNOSIS — Z01.419 WELL WOMAN EXAM WITH ROUTINE GYNECOLOGICAL EXAM: Primary | ICD-10-CM

## 2023-11-13 PROCEDURE — 99395 PREV VISIT EST AGE 18-39: CPT | Performed by: NURSE PRACTITIONER

## 2023-11-13 PROCEDURE — 3079F DIAST BP 80-89 MM HG: CPT | Performed by: NURSE PRACTITIONER

## 2023-11-13 PROCEDURE — 3074F SYST BP LT 130 MM HG: CPT | Performed by: NURSE PRACTITIONER

## 2023-11-13 RX ORDER — ETONOGESTREL AND ETHINYL ESTRADIOL VAGINAL .015; .12 MG/D; MG/D
1 RING VAGINAL AS DIRECTED
Qty: 3 EACH | Refills: 3 | Status: SHIPPED | OUTPATIENT
Start: 2023-11-13

## 2023-11-17 ENCOUNTER — LAB ENCOUNTER (OUTPATIENT)
Dept: LAB | Age: 38
End: 2023-11-17
Attending: NURSE PRACTITIONER
Payer: COMMERCIAL

## 2023-11-17 DIAGNOSIS — Z01.419 WELL WOMAN EXAM WITH ROUTINE GYNECOLOGICAL EXAM: ICD-10-CM

## 2023-11-17 LAB
ALBUMIN SERPL-MCNC: 4.2 G/DL (ref 3.2–4.8)
ALBUMIN/GLOB SERPL: 1.6 {RATIO} (ref 1–2)
ALP LIVER SERPL-CCNC: 74 U/L
ALT SERPL-CCNC: 33 U/L
ANION GAP SERPL CALC-SCNC: 9 MMOL/L (ref 0–18)
AST SERPL-CCNC: 26 U/L (ref ?–34)
BILIRUB SERPL-MCNC: 0.4 MG/DL (ref 0.3–1.2)
BUN BLD-MCNC: 9 MG/DL (ref 9–23)
BUN/CREAT SERPL: 12 (ref 10–20)
CALCIUM BLD-MCNC: 9.2 MG/DL (ref 8.7–10.4)
CHLORIDE SERPL-SCNC: 107 MMOL/L (ref 98–112)
CHOLEST SERPL-MCNC: 195 MG/DL (ref ?–200)
CO2 SERPL-SCNC: 25 MMOL/L (ref 21–32)
CREAT BLD-MCNC: 0.75 MG/DL
DEPRECATED RDW RBC AUTO: 42 FL (ref 35.1–46.3)
EGFRCR SERPLBLD CKD-EPI 2021: 105 ML/MIN/1.73M2 (ref 60–?)
ERYTHROCYTE [DISTWIDTH] IN BLOOD BY AUTOMATED COUNT: 13.5 % (ref 11–15)
EST. AVERAGE GLUCOSE BLD GHB EST-MCNC: 105 MG/DL (ref 68–126)
FASTING PATIENT LIPID ANSWER: YES
FASTING STATUS PATIENT QL REPORTED: YES
GLOBULIN PLAS-MCNC: 2.7 G/DL (ref 2.8–4.4)
GLUCOSE BLD-MCNC: 93 MG/DL (ref 70–99)
HBA1C MFR BLD: 5.3 % (ref ?–5.7)
HCT VFR BLD AUTO: 39.8 %
HDLC SERPL-MCNC: 61 MG/DL (ref 40–59)
HGB BLD-MCNC: 13.2 G/DL
LDLC SERPL CALC-MCNC: 114 MG/DL (ref ?–100)
MCH RBC QN AUTO: 27.9 PG (ref 26–34)
MCHC RBC AUTO-ENTMCNC: 33.2 G/DL (ref 31–37)
MCV RBC AUTO: 84.1 FL
NONHDLC SERPL-MCNC: 134 MG/DL (ref ?–130)
OSMOLALITY SERPL CALC.SUM OF ELEC: 290 MOSM/KG (ref 275–295)
PLATELET # BLD AUTO: 496 10(3)UL (ref 150–450)
POTASSIUM SERPL-SCNC: 4.1 MMOL/L (ref 3.5–5.1)
PROT SERPL-MCNC: 6.9 G/DL (ref 5.7–8.2)
RBC # BLD AUTO: 4.73 X10(6)UL
SODIUM SERPL-SCNC: 141 MMOL/L (ref 136–145)
T4 FREE SERPL-MCNC: 1.1 NG/DL (ref 0.8–1.7)
TRIGL SERPL-MCNC: 110 MG/DL (ref 30–149)
TSI SER-ACNC: 2.17 MIU/ML (ref 0.55–4.78)
VIT D+METAB SERPL-MCNC: 14.6 NG/ML (ref 30–100)
VLDLC SERPL CALC-MCNC: 19 MG/DL (ref 0–30)
WBC # BLD AUTO: 8.6 X10(3) UL (ref 4–11)

## 2023-11-17 PROCEDURE — 84443 ASSAY THYROID STIM HORMONE: CPT

## 2023-11-17 PROCEDURE — 85027 COMPLETE CBC AUTOMATED: CPT

## 2023-11-17 PROCEDURE — 82306 VITAMIN D 25 HYDROXY: CPT

## 2023-11-17 PROCEDURE — 80053 COMPREHEN METABOLIC PANEL: CPT

## 2023-11-17 PROCEDURE — 83036 HEMOGLOBIN GLYCOSYLATED A1C: CPT

## 2023-11-17 PROCEDURE — 36415 COLL VENOUS BLD VENIPUNCTURE: CPT

## 2023-11-17 PROCEDURE — 80061 LIPID PANEL: CPT

## 2023-11-17 PROCEDURE — 84439 ASSAY OF FREE THYROXINE: CPT

## 2023-11-18 ENCOUNTER — PATIENT MESSAGE (OUTPATIENT)
Dept: INTERNAL MEDICINE CLINIC | Facility: CLINIC | Age: 38
End: 2023-11-18

## 2023-11-19 RX ORDER — BENZONATATE 200 MG/1
200 CAPSULE ORAL 3 TIMES DAILY PRN
Qty: 20 CAPSULE | Refills: 0 | Status: SHIPPED | OUTPATIENT
Start: 2023-11-19

## 2023-11-19 NOTE — TELEPHONE ENCOUNTER
Dr. Issa Rodriguez, please advise on refill. Patient seen in [address] [place] [address] on 11/6/23 for cough/URI. From: Lis Mckeon  To: Taryn Lindsey  Sent: 11/18/2023  8:17 AM CST  Subject: Benzonatate    I still have a bad cough on and off. Can I please get a refill on these?  They really help

## 2023-11-21 ENCOUNTER — OFFICE VISIT (OUTPATIENT)
Dept: OTOLARYNGOLOGY | Facility: CLINIC | Age: 38
End: 2023-11-21
Payer: COMMERCIAL

## 2023-11-21 VITALS — WEIGHT: 199 LBS | HEIGHT: 64 IN | BODY MASS INDEX: 33.97 KG/M2

## 2023-11-21 DIAGNOSIS — J34.3 NASAL TURBINATE HYPERTROPHY: ICD-10-CM

## 2023-11-21 DIAGNOSIS — J32.9 RECURRENT SINUSITIS: Primary | ICD-10-CM

## 2023-11-21 PROCEDURE — 99213 OFFICE O/P EST LOW 20 MIN: CPT | Performed by: SPECIALIST

## 2023-11-21 PROCEDURE — 3008F BODY MASS INDEX DOCD: CPT | Performed by: SPECIALIST

## 2023-11-21 RX ORDER — CEFDINIR 300 MG/1
300 CAPSULE ORAL 2 TIMES DAILY
Qty: 28 CAPSULE | Refills: 0 | Status: SHIPPED | OUTPATIENT
Start: 2023-11-21

## 2023-11-21 RX ORDER — PREDNISONE 20 MG/1
20 TABLET ORAL DAILY
Qty: 3 TABLET | Refills: 0 | Status: SHIPPED | OUTPATIENT
Start: 2023-11-21

## 2023-11-21 NOTE — PATIENT INSTRUCTIONS
You were placed on a trial of Omnicef and prednisone for your sinusitis right greater than left. Follow-up in 3 weeks time, sooner if problems. If symptoms are persistent, a CT of the sinuses will be recommended.

## 2023-12-09 DIAGNOSIS — Z76.0 MEDICATION REFILL: ICD-10-CM

## 2023-12-11 ENCOUNTER — OFFICE VISIT (OUTPATIENT)
Dept: INTERNAL MEDICINE CLINIC | Facility: CLINIC | Age: 38
End: 2023-12-11
Payer: COMMERCIAL

## 2023-12-11 VITALS
WEIGHT: 199 LBS | BODY MASS INDEX: 33.97 KG/M2 | HEART RATE: 99 BPM | DIASTOLIC BLOOD PRESSURE: 78 MMHG | HEIGHT: 64 IN | SYSTOLIC BLOOD PRESSURE: 118 MMHG

## 2023-12-11 DIAGNOSIS — F41.8 DEPRESSION WITH ANXIETY: ICD-10-CM

## 2023-12-11 DIAGNOSIS — Z00.00 ANNUAL PHYSICAL EXAM: Primary | ICD-10-CM

## 2023-12-11 PROCEDURE — 3074F SYST BP LT 130 MM HG: CPT | Performed by: INTERNAL MEDICINE

## 2023-12-11 PROCEDURE — 3008F BODY MASS INDEX DOCD: CPT | Performed by: INTERNAL MEDICINE

## 2023-12-11 PROCEDURE — 3078F DIAST BP <80 MM HG: CPT | Performed by: INTERNAL MEDICINE

## 2023-12-11 PROCEDURE — 90471 IMMUNIZATION ADMIN: CPT | Performed by: INTERNAL MEDICINE

## 2023-12-11 PROCEDURE — 90686 IIV4 VACC NO PRSV 0.5 ML IM: CPT | Performed by: INTERNAL MEDICINE

## 2023-12-11 PROCEDURE — 99395 PREV VISIT EST AGE 18-39: CPT | Performed by: INTERNAL MEDICINE

## 2023-12-11 RX ORDER — BUPROPION HYDROCHLORIDE 100 MG/1
100 TABLET, EXTENDED RELEASE ORAL 2 TIMES DAILY
Qty: 30 TABLET | Refills: 0 | Status: SHIPPED | OUTPATIENT
Start: 2023-12-11

## 2023-12-11 RX ORDER — ETONOGESTREL AND ETHINYL ESTRADIOL VAGINAL .015; .12 MG/D; MG/D
1 RING VAGINAL AS DIRECTED
Qty: 3 EACH | Refills: 3 | OUTPATIENT
Start: 2023-12-11

## 2023-12-11 NOTE — PATIENT INSTRUCTIONS
You received a flu shot today. I also would recommend the updated COVID booster. Continue healthy diet and regular exercise. Begin Wellbutrin  daily and schedule counseling. Follow-up visit in 1 month.

## 2023-12-13 ENCOUNTER — PATIENT MESSAGE (OUTPATIENT)
Dept: INTERNAL MEDICINE CLINIC | Facility: CLINIC | Age: 38
End: 2023-12-13

## 2023-12-14 NOTE — TELEPHONE ENCOUNTER
From: Gaby Jc  To: Sheng Gonzalez  Sent: 12/13/2023 10:47 PM CST  Subject: Wellbutrin dosage     Hello, is Wellbutrin once daily? The bottle says twice.  Thank you

## 2023-12-14 NOTE — TELEPHONE ENCOUNTER
Please advise on medication:    LOV 12/11/23    2. Depression with anxiety  Begin Wellbutrin  mg daily. Prescription sent to pharmacy. Follow-up visit in 1 month for reevaluation. Referral for counseling given. She will schedule.

## 2023-12-15 RX ORDER — BUPROPION HYDROCHLORIDE 100 MG/1
100 TABLET, EXTENDED RELEASE ORAL 2 TIMES DAILY
Qty: 60 TABLET | Refills: 2 | Status: SHIPPED | OUTPATIENT
Start: 2023-12-15

## 2023-12-18 ENCOUNTER — PATIENT MESSAGE (OUTPATIENT)
Dept: OTOLARYNGOLOGY | Facility: CLINIC | Age: 38
End: 2023-12-18

## 2023-12-18 RX ORDER — FLUTICASONE PROPIONATE 50 MCG
2 SPRAY, SUSPENSION (ML) NASAL DAILY
Qty: 3 EACH | Refills: 3 | Status: SHIPPED | OUTPATIENT
Start: 2023-12-18

## 2023-12-18 NOTE — TELEPHONE ENCOUNTER
Refill passed per [facility] protocol. Requested Prescriptions   Pending Prescriptions Disp Refills    fluticasone propionate 50 MCG/ACT Nasal Suspension 1 each 1     Si sprays by Each Nare route daily.        Allergy Medication Protocol Passed - 2023  6:24 PM        Passed - In person appointment or virtual visit in the past 12 mos or appointment in next 3 mos     Recent Outpatient Visits              1 week ago Annual physical exam    Bel Duran MD    Office Visit    3 weeks ago Recurrent sinusitis    Geronimo Sanchez MD    Office Visit    1 month ago Well woman exam with routine gynecological exam    [address], [address],  [place] [address], APR    Office Visit    8 months ago Acute URI    Nuria Patel MD    Telemedicine    1 year ago Well woman exam with routine gynecological exam    [address], [address], [address] [facility], APRN    Office Visit          Future Appointments         Provider Department Appt Notes    In 2 weeks MD Abelardo Perla Addison Follow up    In 1 month MD Abelardo Frost, [place]                  Future Appointments         Provider Department Appt Notes    In 2 weeks MD Abelardo Perla Addison Follow up    In 1 month Alice Miranda MD [address], [address], [place] [facility]          Recent Outpatient Visits              1 week ago Annual physical exam    Bel Duran MD    Office Visit    3 weeks ago Recurrent sinusitis    Geronimo Sanchez MD    Office Visit    1 month ago Well woman exam with routine gynecological exam    [address], [address] [facility],[address], [address] [facility] [address] Raleigh Jeans, APRN    Office Visit    8 months ago Acute URI    Arlette Barone MD    Telemedicine    1 year ago Well woman exam with routine gynecological exam    [address], [address] [facility], ROBERT Levy    Office Visit

## 2023-12-20 RX ORDER — BENZONATATE 200 MG/1
200 CAPSULE ORAL 3 TIMES DAILY PRN
Qty: 30 CAPSULE | Refills: 0 | Status: SHIPPED | OUTPATIENT
Start: 2023-12-20

## 2024-02-13 ENCOUNTER — PATIENT MESSAGE (OUTPATIENT)
Dept: OBGYN CLINIC | Facility: CLINIC | Age: 39
End: 2024-02-13

## 2024-02-13 DIAGNOSIS — Z76.0 MEDICATION REFILL: ICD-10-CM

## 2024-02-13 RX ORDER — ETONOGESTREL AND ETHINYL ESTRADIOL VAGINAL RING .015; .12 MG/D; MG/D
1 RING VAGINAL AS DIRECTED
Qty: 3 EACH | Refills: 3 | OUTPATIENT
Start: 2024-02-13

## 2024-02-13 NOTE — TELEPHONE ENCOUNTER
From: Karlene Richter  To: Merlene Ballard  Sent: 2/13/2024 8:36 AM CST  Subject: Nuva ring     Hello, I please need the pharmacy to refill my birth control. I insert the nuva ring 2 months in a row. Thank you

## 2024-03-23 ENCOUNTER — TELEPHONE (OUTPATIENT)
Dept: INTERNAL MEDICINE CLINIC | Facility: CLINIC | Age: 39
End: 2024-03-23

## 2024-03-23 NOTE — TELEPHONE ENCOUNTER
Patient scheduled an appt via [employer] for the following concern:    Heart feels likes it pumping too fast

## 2024-03-25 NOTE — TELEPHONE ENCOUNTER
Noted patient had an appt for today for heart complaint with PCP but then canceled via [employer].     Left message to call back.  [employer] message was sent.

## 2024-04-05 ENCOUNTER — TELEMEDICINE (OUTPATIENT)
Dept: INTERNAL MEDICINE CLINIC | Facility: CLINIC | Age: 39
End: 2024-04-05
Payer: COMMERCIAL

## 2024-04-05 DIAGNOSIS — M25.552 BILATERAL HIP PAIN: Primary | ICD-10-CM

## 2024-04-05 DIAGNOSIS — M25.551 BILATERAL HIP PAIN: Primary | ICD-10-CM

## 2024-04-05 NOTE — PROGRESS NOTES
Karlene Richter is a 39 year old female here today for a telemedicine/video visit.   HPI:   Please note that the following visit was completed using two-way, real-time interactive audio and video communication.  This has been done in [place] to provide continuity of care in the best interest of the provider-patient relationship, due to the ongoing public health crisis/national emergency and because of restrictions of visitation.  There are limitations of this visit as no physical exam could be performed.  Every conscious effort was taken to allow for sufficient and adequate time.  This billing was spent on reviewing labs, medications, radiology tests and decision making.  Appropriate medical decision-making and tests are ordered as detailed in the plan of care below.  Video Visit.     Karlene Richter verbally consents to a visit conducted using Telemedicine with live, interactive two way video and audio service on 4/5/2024.     Patient understands and accepts financial responsibility for any deductible, co-insurance and/or co-pays associated with this service. The patient was in the state of [place] during this encounter.     Duration of the service: 15 minutes, including clinical documentation     Summary of topics discussed:  please see note below.     Call placed in lieu of follow up visit due to COVID-19 pandemic.  A MyChart audio and video visit was done  Karlene scheduled a video visit today to request a referral to a chiropractor.  For years, she has had intermittent bilateral right greater than left hip pain that seems to occur when she sits for longer periods of time and initially gets up and begins walking.  She has no significant pain with weightbearing and walking otherwise.  No antecedent injury or trauma.  She has done stretching exercises in the past and applied ice without significant relief.  She has had 2 visits with a chiropractor, Dr. Raheem Merrill, and has had adjustments done.  She requests a  referral to continue to see him.  Discussed with her that this particular chiropractor is not in our network so a referral could not be placed.  Discussed options including physical therapy or Physiatry.  She wishes to continue to see this chiropractor, and understands that she will need to pay for his services herself.  Current Outpatient Medications   Medication Sig Dispense Refill    buPROPion  MG Oral Tablet 12 Hr Take 1 tablet (150 mg total) by mouth 2 (two) times daily. 60 tablet 3    ALPRAZolam 0.5 MG Oral Tab Take 1 tablet (0.5 mg total) by mouth 2 (two) times daily as needed. 20 tablet 0    benzonatate 200 MG Oral Cap Take 1 capsule (200 mg total) by mouth 3 (three) times daily as needed for cough. 30 capsule 0    fluticasone propionate 50 MCG/ACT Nasal Suspension 2 sprays by Each Nare route daily. 3 each 3    Etonogestrel-Ethinyl Estradiol (NUVARING) 0.12-0.015 MG/24HR Vaginal Ring Place 1 Ring vaginally As Directed. Insert ring in vagina x 3 weeks, then remove and replace with new ring for 3 weeks, then remove for one week. 3 each 3    VALACYCLOVIR HCL OR       ibuprofen 200 MG Oral Tab Take 1 tablet (200 mg total) by mouth every 6 (six) hours as needed for Pain.       No Known Allergies   Past Medical History:   Diagnosis Date    Depression with anxiety     Genital herpes     Hypercholesterolemia     Migraines      Past Surgical History:   Procedure Laterality Date    WISDOM TEETH REMOVED  01/30/2016      Social History:  Social History     Socioeconomic History    Marital status:    Tobacco Use    Smoking status: Never    Smokeless tobacco: Never   Vaping Use    Vaping Use: Never used   Substance and Sexual Activity    Alcohol use: Yes     Comment: 2 glasses of wine 2 days weekly    Drug use: Never    Sexual activity: Yes     Partners: Male     Birth control/protection: Inserts     Comment: nuva ring   Other Topics Concern    Caffeine Concern Yes     Comment: coffee, 1 cup   Social History  Narrative    ** Merged History Encounter **             EXAM:   GENERAL: Pleasant female conversing normally in no obvious distress  LMP 09/20/2023 (Approximate)   HEENT: Voice normal  LUNGS: Breathing nonlabored  NEURO: Alert and appropriate. Affect normal. Mental status grossly intact      ASSESSMENT AND PLAN:   1. Bilateral hip pain  As per above discussion, she will continue to see the out of network chiropractor and pay for services herself      The patient indicates understanding of these issues and agrees to the plan.  The patient is asked to return as needed.    Dwayne Au MD  4/5/2024  3:14 PM

## 2024-05-12 DIAGNOSIS — F41.8 DEPRESSION WITH ANXIETY: ICD-10-CM

## 2024-05-14 RX ORDER — ALPRAZOLAM 0.5 MG/1
0.5 TABLET ORAL 2 TIMES DAILY PRN
Qty: 20 TABLET | Refills: 0 | Status: SHIPPED | OUTPATIENT
Start: 2024-05-14

## 2024-05-14 NOTE — TELEPHONE ENCOUNTER
Please review. Rx failed/no protocol.    Recent fills (qty #20 ea for a 10 day supply): 02/09/24  Last prescription written on: 02/09/24  Last office visit: 12/11/2023  Requested Prescriptions   Pending Prescriptions Disp Refills    ALPRAZolam 0.5 MG Oral Tab 20 tablet 0     Sig: Take 1 tablet (0.5 mg total) by mouth 2 (two) times daily as needed.       Controlled Substance Medication Failed - 5/12/2024  7:04 PM        Failed - This medication is a controlled substance - forward to provider to refill               Recent Outpatient Visits              1 month ago Bilateral hip pain    [facility]    [facility]    2 months ago Depression with anxiety    [facility]    5 months ago [facility]    5 months ago Recurrent sinusitis    [facility] Merna Maddox MD    Office Visit    6 months ago Well woman exam with routine gynecological exam    [facility] [place] [facility] Merlene Ballard APRN    Office Visit

## 2024-05-20 ENCOUNTER — TELEMEDICINE (OUTPATIENT)
Dept: INTERNAL MEDICINE CLINIC | Facility: CLINIC | Age: 39
End: 2024-05-20

## 2024-05-20 DIAGNOSIS — Z91.89 LACK OF MOTIVATION: Primary | ICD-10-CM

## 2024-05-20 PROCEDURE — 99213 OFFICE O/P EST LOW 20 MIN: CPT | Performed by: INTERNAL MEDICINE

## 2024-05-20 NOTE — PROGRESS NOTES
Karlene Richter is a 39 year old female here today for a telemedicine/video visit.   HPI:   Please note that the following visit was completed using two-way, real-time interactive audio and video communication.  This has been done in [place] to provide continuity of care in the best interest of the provider-patient relationship, due to the ongoing public health crisis/national emergency and because of restrictions of visitation.  There are limitations of this visit as no physical exam could be performed.  Every conscious effort was taken to allow for sufficient and adequate time.  This billing was spent on reviewing labs, medications, radiology tests and decision making.  Appropriate medical decision-making and tests are ordered as detailed in the plan of care below.  Video Visit.     Karlene Richter verbally consents to a visit conducted using Telemedicine with live, interactive two way video and audio service on 5/20/2024.     Patient understands and accepts financial responsibility for any deductible, co-insurance and/or co-pays associated with this service. The patient was in the state of [place] during this encounter.     Duration of the service: 15 minutes, including clinical documentation     Summary of topics discussed:  please see note below.     Call placed in lieu of follow up visit due to COVID-19 pandemic.  A MyChart audio and video visit was done  Karlene scheduled a video visit with me today wondering whether I could prescribe Adderall.  She is currently on bupropion for depression with anxiety.  She has a friend who takes Adderall, and her friend gave her 2 Adderall pills which she took on different days, and she noticed that she was much more productive and motivated when taking Adderall, with less anxiety about completing tasks.  She is not sure what strength Adderall pill she took.  She is hoping that I can prescribe Adderall.  She has not been evaluated for or diagnosed with ADD or ADHD in the  past.  Discussed with her that I would not feel comfortable prescribing Adderall without a formal diagnosis.  Discussed with her that Adderall is a controlled substance that should not be shared between friends.  Recommend she schedule an appointment with Psychiatry to further evaluate symptoms and establish a diagnosis that might support the use of Adderall.  She is agreeable.  Current Outpatient Medications   Medication Sig Dispense Refill    ALPRAZolam 0.5 MG Oral Tab Take 1 tablet (0.5 mg total) by mouth 2 (two) times daily as needed. 20 tablet 0    buPROPion  MG Oral Tablet 12 Hr Take 1 tablet (150 mg total) by mouth 2 (two) times daily. 60 tablet 3    fluticasone propionate 50 MCG/ACT Nasal Suspension 2 sprays by Each Nare route daily. 3 each 3    Etonogestrel-Ethinyl Estradiol (NUVARING) 0.12-0.015 MG/24HR Vaginal Ring Place 1 Ring vaginally As Directed. Insert ring in vagina x 3 weeks, then remove and replace with new ring for 3 weeks, then remove for one week. 3 each 3    VALACYCLOVIR HCL OR       ibuprofen 200 MG Oral Tab Take 1 tablet (200 mg total) by mouth every 6 (six) hours as needed for Pain.       No Known Allergies   Past Medical History:    Depression with anxiety    Genital herpes    Hypercholesterolemia    Migraines     Past Surgical History:   Procedure Laterality Date    [place] teeth removed  01/30/2016      Social History:  Social History     Socioeconomic History    Marital status:    Tobacco Use    Smoking status: Never    Smokeless tobacco: Never   Vaping Use    Vaping status: Never Used   Substance and Sexual Activity    Alcohol use: Yes     Comment: 2 glasses of wine 2 days weekly    Drug use: Never    Sexual activity: Yes     Partners: Male     Birth control/protection: Inserts     Comment: nuva ring   Other Topics Concern    Caffeine Concern Yes     Comment: coffee, 1 cup   Social History Narrative    ** Merged History Encounter **             EXAM:   GENERAL: Pleasant  female conversing normally in no obvious distress  LMP 09/20/2023 (Approximate)   HEENT: Voice normal  LUNGS: Breathing nonlabored  NEURO: Alert and appropriate. Affect normal. Mental status grossly intact      ASSESSMENT AND PLAN:   1. Lack of motivation  Referral to Psychiatry completed      The patient indicates understanding of these issues and agrees to the plan.  The patient is asked to return as needed.    Dwayne Au MD  5/20/2024  1:07 PM

## 2024-05-20 NOTE — PATIENT INSTRUCTIONS
Karlene, I placed a referral to Psychiatry  Please let me know if you are not contacted soon to schedule an appointment

## 2024-05-23 ENCOUNTER — TELEPHONE (OUTPATIENT)
Age: 39
End: 2024-05-23

## 2024-05-30 ENCOUNTER — TELEPHONE (OUTPATIENT)
Age: 39
End: 2024-05-30

## 2024-06-28 RX ORDER — BUPROPION HYDROCHLORIDE 150 MG/1
150 TABLET, EXTENDED RELEASE ORAL 2 TIMES DAILY
Qty: 180 TABLET | Refills: 3 | Status: SHIPPED | OUTPATIENT
Start: 2024-06-28

## 2024-06-28 NOTE — TELEPHONE ENCOUNTER
Refill Per Protocol     Requested Prescriptions   Pending Prescriptions Disp Refills    buPROPion  MG Oral Tablet 12 Hr 60 tablet 3     Sig: Take 1 tablet (150 mg total) by mouth 2 (two) times daily.       Psychiatric Non-Scheduled (Anti-Anxiety) Passed - 6/25/2024 12:13 PM        Passed - In person appointment or virtual visit in the past 6 mos or appointment in next 3 mos     Recent Outpatient Visits              1 month ago [facility] of [facility]    [facility]    2 months ago Bilateral hip pain    [facility]    [facility]icine    4 months ago Depression with anxiety    [facility]    Telemedicine    6 months ago Annual physical exam    [facility]    Office Visit    7 months ago Recurrent sinusitis    [facility]    [facility]                      Passed - Depression Screening completed within the past 12 months                 Recent Outpatient Visits              1 month ago Lack of motivation    [facility]    Telemedicine    2 months ago Bilateral hip pain    [facility]    Telemedicine    4 months ago Depression with anxiety    [facility]    Telemedicine    6 months ago Annual physical exam    [facility]    Office Visit    7 months ago Recurrent sinusitis    [facility]    [facility] Visit

## 2024-07-22 NOTE — TELEPHONE ENCOUNTER
Refill Per Protocol   Please Advise medication is patient external reported   Requested Prescriptions   Pending Prescriptions Disp Refills    VALACYCLOVIR HCL OR  0       Herpes Agent Protocol Passed - 7/18/2024  7:47 PM        Passed - In person appointment or virtual visit in the past 12 mos or appointment in next 3 mos     Recent Outpatient Visits              2 months ago Lack of motivation    [facility]    [facility]    3 months ago Bilateral hip pain    [facility]    [facility]    5 months ago Depression with anxiety    [facility]    [facility]icine    7 months ago Annual physical exam    [facility]    Office Visit    8 months ago Recurrent sinusitis    [facility]                               Recent Outpatient Visits              2 months ago Lack of motivation    [facility]    Telemedicine    3 months ago Bilateral hip pain    [facility]    [facility]icine    5 months ago Depression with anxiety    [facility]    Telemedicine    7 months ago Annual physical exam    [facility]    Office Visit    8 months ago Recurrent sinusitis    [facility]    [facility] Visit

## 2024-07-23 RX ORDER — VALACYCLOVIR HYDROCHLORIDE 500 MG/1
500 TABLET, FILM COATED ORAL 2 TIMES DAILY
Qty: 6 TABLET | Refills: 1 | Status: SHIPPED | OUTPATIENT
Start: 2024-07-23 | End: 2024-07-26

## 2024-07-29 ENCOUNTER — PATIENT MESSAGE (OUTPATIENT)
Dept: OBGYN CLINIC | Facility: CLINIC | Age: 39
End: 2024-07-29

## 2024-07-29 DIAGNOSIS — Z76.0 MEDICATION REFILL: ICD-10-CM

## 2024-07-29 DIAGNOSIS — F41.8 DEPRESSION WITH ANXIETY: ICD-10-CM

## 2024-07-29 RX ORDER — ETONOGESTREL AND ETHINYL ESTRADIOL VAGINAL RING .015; .12 MG/D; MG/D
1 RING VAGINAL AS DIRECTED
Qty: 3 EACH | Refills: 1 | Status: SHIPPED | OUTPATIENT
Start: 2024-07-29

## 2024-07-29 RX ORDER — ETONOGESTREL AND ETHINYL ESTRADIOL VAGINAL RING .015; .12 MG/D; MG/D
1 RING VAGINAL AS DIRECTED
Qty: 3 EACH | Refills: 3 | Status: CANCELLED | OUTPATIENT
Start: 2024-07-29

## 2024-07-29 NOTE — TELEPHONE ENCOUNTER
From: Karlene Richter  To: Merlene Ballard  Sent: 7/29/2024 1:49 PM CDT  Subject: Nuva ring     I please need a refill. I discussed with my primary that I insert two rings in a row every two months, so I only get six periods a year.

## 2024-07-29 NOTE — TELEPHONE ENCOUNTER
Last annual - 11/13/2023  Last pap - 8/4/2021, normal    Prescription directions are to use 2 rings back to back to skip a period, then place the third ring after a week off to have a period.  Last prescription was sent on 11/13/2023 for 3 rings with 3 refills.  Patient will need refills to cover until annual is due based on the way she is using the ring.  Refill sent.

## 2024-07-29 NOTE — TELEPHONE ENCOUNTER
Requested Prescriptions     Pending Prescriptions Disp Refills    Etonogestrel-Ethinyl Estradiol (NUVARING) 0.12-0.015 MG/24HR Vaginal Ring 3 each 3     Sig: Place 1 Ring vaginally As Directed. Insert ring in vagina x 3 weeks, then remove and replace with new ring for 3 weeks, then remove for one week.     Last annual 11/3/23  Last filled 11/13/23 x 1 year  UTD    Next annual Due Nov 2024.

## 2024-08-01 RX ORDER — ALPRAZOLAM 0.5 MG/1
0.5 TABLET ORAL 2 TIMES DAILY PRN
Qty: 20 TABLET | Refills: 0 | Status: SHIPPED | OUTPATIENT
Start: 2024-08-01

## 2024-08-01 NOTE — TELEPHONE ENCOUNTER
Please review. Protocol Failed; No Protocol      Recent fills: 2/9/2024, 5/14/2024  Last Rx written: 5/14/2024  Last office visit: 12/11/2023  Televisit: 5/20/2024        Requested Prescriptions   Pending Prescriptions Disp Refills    ALPRAZolam 0.5 MG Oral Tab 20 tablet 0     Sig: Take 1 tablet (0.5 mg total) by mouth 2 (two) times daily as needed.       Controlled Substance Medication Failed - 7/29/2024 10:05 AM        Failed - This medication is a controlled substance - forward to provider to refill               Future Appointments         Provider Department Appt Notes    In 1 month Merlene Ballard APRN [facility] ([place] [place] [place] - OB/GYN Yearly  last px 11/13/23          Recent Outpatient Visits              2 months ago Lack of motivation    [facility] [place] Nelli Sandoval Michael, MD    Telemedicine    3 months ago Bilateral hip pain    [facility]    5 months ago Depression with anxiety    [facility]    Telemedicine    7 months ago Annual physical exam    [facility]    [facility]    8 months ago Recurrent sinusitis    [facility] [place] Merna Maddox MD    Office Visit

## 2024-11-08 ENCOUNTER — TELEPHONE (OUTPATIENT)
Dept: INTERNAL MEDICINE CLINIC | Facility: CLINIC | Age: 39
End: 2024-11-08

## 2024-11-08 NOTE — TELEPHONE ENCOUNTER
[employer] message read by patient.     November 7, 2024  Karlene Richter to [facility] (supporting Dwayne Au MD)         11/7/24  1:22 PM  Hi, I’m feeling more depressed than usual. I think the change in weather and no sun last few days. Can I increase my dose?

## 2024-11-13 ENCOUNTER — TELEPHONE (OUTPATIENT)
Age: 39
End: 2024-11-13

## 2024-11-13 NOTE — TELEPHONE ENCOUNTER
I am reaching out from the [facility] Navigation department, following up on an order from your provider's office to assist in connecting you with resources for care. If you would like to discuss this further, please give us a call back at [phone], or for more immediate assistance you can contact our 24-hour help line at [phone]. We look forward to hearing from you soon.

## 2024-11-21 DIAGNOSIS — F41.8 DEPRESSION WITH ANXIETY: ICD-10-CM

## 2024-11-22 ENCOUNTER — TELEPHONE (OUTPATIENT)
Age: 39
End: 2024-11-22

## 2024-11-25 DIAGNOSIS — F41.8 DEPRESSION WITH ANXIETY: ICD-10-CM

## 2024-11-26 RX ORDER — ALPRAZOLAM 0.5 MG
0.5 TABLET ORAL 2 TIMES DAILY PRN
Qty: 20 TABLET | Refills: 0 | Status: SHIPPED | OUTPATIENT
Start: 2024-11-26

## 2024-11-26 RX ORDER — ALPRAZOLAM 0.5 MG
0.5 TABLET ORAL 2 TIMES DAILY PRN
Qty: 20 TABLET | Refills: 0 | OUTPATIENT
Start: 2024-11-26

## 2024-11-26 NOTE — TELEPHONE ENCOUNTER
Please review. Protocol Failed; No Protocol      Recent fills: 8/1/2024  Last Rx written: 8/1/2024  Last office visit: 12/11/2023  Tele visit: 11/11/2024        Requested Prescriptions   Pending Prescriptions Disp Refills    ALPRAZolam 0.5 MG Oral Tab 20 tablet 0     Sig: Take 1 tablet (0.5 mg total) by mouth 2 (two) times daily as needed.       Controlled Substance Medication Failed - 11/26/2024  1:51 PM        Failed - This medication is a controlled substance - forward to provider to refill               Future Appointments         Provider Department Appt Notes    In 2 weeks Merna Quiros MD [facility] [place] [place] [facility] eczema          Recent Outpatient Visits              2 weeks ago Depression with anxiety    [facility]    [facility]    6 months ago Lack of motivation    [facility]    7 months ago Bilateral hip pain    [facility]    9 months ago Depression with anxiety    [facility]    11 months ago Annual physical exam    [facility]    [facility] Visit

## 2025-01-20 ENCOUNTER — PATIENT MESSAGE (OUTPATIENT)
Dept: OBGYN CLINIC | Facility: CLINIC | Age: 40
End: 2025-01-20

## 2025-02-05 RX ORDER — VALACYCLOVIR HYDROCHLORIDE 500 MG/1
500 TABLET, FILM COATED ORAL 2 TIMES DAILY
Qty: 6 TABLET | Refills: 0 | Status: SHIPPED | OUTPATIENT
Start: 2025-02-05

## 2025-02-05 NOTE — TELEPHONE ENCOUNTER
Please call patient to make an appointment.  Thank you. Was patient of Dr. Au (retired)  Last Physical Exam 12/11/23  Last visit was Telemedicine visit on 11/11/24

## 2025-02-05 NOTE — TELEPHONE ENCOUNTER
Please review.  Protocol failed / Has no protocol.    [employer] message sent to patient to schedule an office visit with Primary Care Provider.   Will also route to Call Center to make a phone attempt.    Last visit was Telemedicine visit on 11/11/24, last Physical Exam 12/11/23      Requested Prescriptions   Pending Prescriptions Disp Refills    valACYclovir 500 MG Oral Tab 6 tablet 0     Sig: Take 1 tablet (500 mg total) by mouth 2 (two) times daily.   **Appointment needed for further refills.        Herpes Agent Protocol Failed - 2/5/2025 12:33 PM        Failed - Medication is active on med list        Passed - In person appointment or virtual visit in the past 12 mos or appointment in next 3 mos     Recent Outpatient Visits              2 months ago Depression with anxiety    [facility]    [facility]    8 months ago Lack of motivation    [facility]    [facility]    10 months ago Bilateral hip pain    [facility]    Telemedicine    11 months ago Depression with anxiety    [facility]    [facility]    1 year ago Annual physical exam    [facility]    [facility] Visit

## 2025-02-06 NOTE — TELEPHONE ENCOUNTER
[employer] message sent to patient to schedule an office visit with PCP.   Please make a phone attempt.

## 2025-03-11 DIAGNOSIS — Z76.0 MEDICATION REFILL: ICD-10-CM

## 2025-03-12 RX ORDER — ETONOGESTREL AND ETHINYL ESTRADIOL VAGINAL RING .015; .12 MG/D; MG/D
1 RING VAGINAL AS DIRECTED
Qty: 3 EACH | Refills: 0 | Status: SHIPPED | OUTPATIENT
Start: 2025-03-12 | End: 2025-05-07

## 2025-05-07 ENCOUNTER — OFFICE VISIT (OUTPATIENT)
Dept: OBGYN CLINIC | Facility: CLINIC | Age: 40
End: 2025-05-07

## 2025-05-07 VITALS
HEIGHT: 64 IN | DIASTOLIC BLOOD PRESSURE: 86 MMHG | SYSTOLIC BLOOD PRESSURE: 130 MMHG | BODY MASS INDEX: 32.61 KG/M2 | HEART RATE: 94 BPM | WEIGHT: 191 LBS

## 2025-05-07 DIAGNOSIS — Z01.419 WELL WOMAN EXAM WITH ROUTINE GYNECOLOGICAL EXAM: Primary | ICD-10-CM

## 2025-05-07 DIAGNOSIS — Z76.0 MEDICATION REFILL: ICD-10-CM

## 2025-05-07 DIAGNOSIS — Z12.31 SCREENING MAMMOGRAM FOR BREAST CANCER: ICD-10-CM

## 2025-05-07 DIAGNOSIS — Z12.4 SCREENING FOR CERVICAL CANCER: ICD-10-CM

## 2025-05-07 PROCEDURE — 3075F SYST BP GE 130 - 139MM HG: CPT | Performed by: NURSE PRACTITIONER

## 2025-05-07 PROCEDURE — 99395 PREV VISIT EST AGE 18-39: CPT | Performed by: NURSE PRACTITIONER

## 2025-05-07 PROCEDURE — 3008F BODY MASS INDEX DOCD: CPT | Performed by: NURSE PRACTITIONER

## 2025-05-07 PROCEDURE — 3079F DIAST BP 80-89 MM HG: CPT | Performed by: NURSE PRACTITIONER

## 2025-05-07 RX ORDER — ETONOGESTREL AND ETHINYL ESTRADIOL VAGINAL RING .015; .12 MG/D; MG/D
1 RING VAGINAL AS DIRECTED
Qty: 3 EACH | Refills: 4 | Status: SHIPPED | OUTPATIENT
Start: 2025-05-07

## 2025-05-07 NOTE — PROGRESS NOTES
The following individual(s) verbally consented to be recorded using ambient AI listening technology and understand that they can each withdraw their consent to this listening technology at any point by asking the clinician to turn off or pause the recording:    Patient name: Karlene PASTORA Richter

## 2025-05-07 NOTE — PROGRESS NOTES
[facility]    Obstetrics and Gynecology    Chief Complaint   Patient presents with    Gyn Exam     Annual, medication refill       Karlene Richter is a 40 year old female  Patient's last menstrual period was 2025 (exact date). presenting for annual gynecology exam. On nuvaring. Last seen 2023.  History of Present Illness  She uses the NuvaRing  continuously for 2 months, periods every other month. No issues with menses.  Her current medications include bupropion 150 mg and alprazolam as needed. She uses valacyclovir as needed for herpes simplex virus outbreaks, though she has not had an outbreak in over a year.     She is sexually active with her same partner, and experiences no pain or bleeding with intercourse. There are no symptoms of abnormal discharge, odor, or irritation. Denies breast concerns    She is not a smoker and is working on increasing her physical activity, aiming to return to a routine of regular exercise.  Hx hsv- valtrex prn. No outbreak in over a year    Pap: NILM, negative human papillomavirus  No history of abnormal paps   Contraception:nuvaring  Mammo: never    OBSTETRICS HISTORY:  OB History    Para Term  AB Living   0 0 0 0 0 0   SAB IAB Ectopic Multiple Live Births   0 0 0 0 0       GYNE HISTORY:  Menarche: 12 y/o (2025 11:13 AM)  Period Cycle (Days): every other month with nuvaring (2025 11:13 AM)  Period Duration (Days): 3-4 days (2025 11:13 AM)  Period Flow: moderate to light (2025 11:13 AM)  Use of Birth Control (if yes, specify type): Nuvaring (2025 11:13 AM)  Hx Prior Abnormal Pap: No (2025 11:13 AM)  Pap Date: 21 (2025 11:13 AM)  Pap Result Notes: PAP/HPV NEG (2025 11:13 AM)      History   Sexual Activity    Sexual activity: Yes    Partners: Male    Birth control/ protection: Inserts     Comment: nuva ring           Latest Ref Rng & Units 2021     6:26 PM 2018     7:07 PM 2016     5:49 PM    RECENT PAP RESULTS   INTERPRETATION/RESULT: Negative for intraepithelial lesion or malignancy Negative for intraepithelial lesion or malignancy  Negative for intraepithelial lesion or malignancy  Shift in jethro suggestive of bacterial vaginosis  Negative for intraepithelial lesion or malignancy    HPV Negative Negative   Negative          MEDICAL HISTORY:  Past Medical History:    Depression with anxiety    Genital herpes    Hypercholesterolemia    Migraines     Past Surgical History:   Procedure Laterality Date    [place] teeth removed  01/30/2016       SOCIAL HISTORY:  Social History     Socioeconomic History    Marital status:      Spouse name: Not on file    Number of children: Not on file    Years of education: Not on file    Highest education level: Not on file   Occupational History    Not on file   Tobacco Use    Smoking status: Never    Smokeless tobacco: Never   Vaping Use    Vaping status: Never Used   Substance and Sexual Activity    Alcohol use: Yes     Comment: socially    Drug use: Never    Sexual activity: Yes     Partners: Male     Birth control/protection: Inserts     Comment: nuva ring   Other Topics Concern     Service Not Asked    Blood Transfusions Not Asked    Caffeine Concern Yes     Comment: coffee, 1 cup    Occupational Exposure Not Asked    Hobby Hazards Not Asked    Sleep Concern Not Asked    Stress Concern Not Asked    Weight Concern Not Asked    Special Diet Not Asked    Back Care Not Asked    Exercise Not Asked    Bike Helmet Not Asked    Seat Belt Not Asked    Self-Exams Not Asked   Social History Narrative    ** Merged History Encounter **          Social Drivers of Health     Food Insecurity: Not on file   Transportation Needs: Not on file   Stress: Not on file   Housing Stability: Not on file         Depression Screening (PHQ-2/PHQ-9): Over the LAST 2 WEEKS   Little interest or pleasure in doing things (over the last two weeks)?: Not at all    Feeling down,  depressed, or hopeless (over the last two weeks)?: Not at all    PHQ-2 SCORE: 0           FAMILY HISTORY:  Family History   Problem Relation Age of Onset    Breast Cancer Maternal Aunt     Diabetes Maternal Grandfather     Hypertension Maternal Grandfather     Diabetes Maternal Grandmother     No Known Problems Mother     Heart Attack Maternal Cousin        MEDICATIONS:    Current Outpatient Medications:     Etonogestrel-Ethinyl Estradiol (NUVARING) 0.12-0.015 MG/24HR Vaginal Ring, Place 1 Ring vaginally As Directed. Insert ring in vagina x 3 weeks, then remove and replace with new ring for 3 weeks, then remove for one week., Disp: 3 each, Rfl: 4    valACYclovir 500 MG Oral Tab, Take 1 tablet (500 mg total) by mouth 2 (two) times daily. **Appointment needed for further refills., Disp: 6 tablet, Rfl: 0    ALPRAZolam 0.5 MG Oral Tab, Take 1 tablet (0.5 mg total) by mouth 2 (two) times daily as needed., Disp: 20 tablet, Rfl: 0    buPROPion  MG Oral Tablet 12 Hr, Take 1 tablet (150 mg total) by mouth 2 (two) times daily., Disp: 180 tablet, Rfl: 3    fluticasone propionate 50 MCG/ACT Nasal Suspension, 2 sprays by Each Nare route daily., Disp: 3 each, Rfl: 3    ibuprofen 200 MG Oral Tab, Take 1 tablet (200 mg total) by mouth every 6 (six) hours as needed for Pain., Disp: , Rfl:     ALLERGIES:  No Known Allergies      Review of Systems:  Constitutional:  Denies fatigue, night sweats, hot flashes  Eyes:  denies blurred or double vision  Cardiovascular:  denies chest pain or palpitations  Respiratory:  denies shortness of breath  Gastrointestinal:  denies heartburn, abdominal pain, diarrhea or constipation  Genitourinary:  denies dysuria, incontinence, abnormal vaginal discharge, vaginal itching,   Musculoskeletal:  denies back pain   Skin/Breast:  Denies any breast pain, lumps, or discharge.   Neurological:  denies headaches, extremity weakness or numbness.  Psychiatric: denies depression or anxiety.  Endocrine:    denies excessive thirst or urination.  Heme/Lymph:  denies history of anemia, easy bruising or bleeding.      PHYSICAL EXAM:     Vitals:    05/07/25 1113   BP: 130/86   Pulse: 94   Weight: 191 lb (86.6 kg)   Height: 5' 4\" (1.626 m)       Body mass index is 32.79 kg/m².     Patient offered chaperone, patient declined    Constitutional: well developed, well nourished  Psychiatric:  Oriented to time, place, person and situation. Appropriate mood and affect  Head/Face: normocephalic  Neck/Thyroid: thyroid symmetric, no thyromegaly, no nodules, no adenopathy  Lymphatic:no abnormal supraclavicular or axillary adenopathy is noted  Breast: normal without palpable masses, tenderness, asymmetry, nipple discharge, nipple retraction or skin changes  Abdomen:  soft, nontender, nondistended, no masses  Skin/Hair: no unusual rashes or bruises  Extremities: no edema, no cyanosis    Pelvic Exam:  External Genitalia: normal appearance, hair distribution, and no lesions  Urethral Meatus:  normal in size, location, without lesions and prolapse  Bladder:  No fullness, masses or tenderness  Vagina:  Normal appearance without lesions, no abnormal discharge  Cervix:  Normal without tenderness on motion  Uterus: normal in size, contour, position, mobility, without tenderness  Adnexa: normal without masses or tenderness  Perineum: normal  Anus: no hemorroids     Assessment & Plan:    ICD-10-CM    1. Well woman exam with routine gynecological exam  Z01.419       2. Screening for cervical cancer  Z12.4 ThinPrep PAP Smear     Hpv Dna  High Risk , Thin Prep Collect     Hpv Dna  High Risk , Thin Prep Collect     ThinPrep PAP Smear      3. Medication refill  Z76.0 Etonogestrel-Ethinyl Estradiol (NUVARING) 0.12-0.015 MG/24HR Vaginal Ring      4. Screening mammogram for breast cancer  Z12.31 [facility] 2D+3D SCREENING BILAT (CPT=77067/41958)           Assessment & Plan  Woman's Wellness Exam  Routine wellness exam with no new health issues. Discussed  exercise for weight management and health. Mammogram ordered as she approaches age 41.  - Perform Pap smear.  - Conduct breast and pelvic exam.  - Order mammogram.  - Encourage regular exercise and weight management.    Use of NuvaRing  Continued NuvaRing for contraception with regular periods. Discussed risks of blood clots, stroke, heart attack, and hypertension due to estrogen. Advised monitoring for symptoms.  - Continue NuvaRing.  - Refill NuvaRing prescription with three rings.    Herpes Simplex Virus infection  No outbreaks in over a year. Valacyclovir used as needed.  - Continue valacyclovir as needed.  - Refill valacyclovir prescription as needed.    Gardasil Vaccine  Discussed Gardasil vaccine for HPV protection. Recommended for those up to age 45. She will check insurance coverage.  - Check insurance coverage for Gardasil vaccine.  - Schedule nurse appointment for vaccine if covered.  Discussed diet, exercise, MVIs with Ca/Vit D  Follow up in 1 yr for ROBERT Corona      This note was prepared using Dragon Medical voice recognition dictation software. As a result errors may occur. When identified these errors have been corrected. While every attempt is made to correct errors during dictation discrepancies may still exist.

## 2025-05-08 LAB — HPV E6+E7 MRNA CVX QL NAA+PROBE: NEGATIVE

## 2025-05-09 ENCOUNTER — TELEPHONE (OUTPATIENT)
Dept: OBGYN CLINIC | Facility: CLINIC | Age: 40
End: 2025-05-09

## 2025-05-09 NOTE — TELEPHONE ENCOUNTER
Called and clarified that patient will do continuously x2 months then break for cycle. They state understanding.

## 2025-05-09 NOTE — TELEPHONE ENCOUNTER
[facility] called in regarding the prescription that was sent over for the nuvaring.   [facility] need clarification and directions.   Request a nurse to call to advise.

## 2025-05-19 ENCOUNTER — TELEPHONE (OUTPATIENT)
Dept: INTERNAL MEDICINE CLINIC | Facility: CLINIC | Age: 40
End: 2025-05-19

## 2025-05-19 DIAGNOSIS — F41.8 DEPRESSION WITH ANXIETY: ICD-10-CM

## 2025-05-21 RX ORDER — ALPRAZOLAM 0.5 MG
0.5 TABLET ORAL 2 TIMES DAILY PRN
Qty: 20 TABLET | Refills: 0 | Status: CANCELLED | OUTPATIENT
Start: 2025-05-21

## 2025-05-21 NOTE — TELEPHONE ENCOUNTER
Please review; protocol failed/No Protocol      Recent Fills: 11/26/2024 #20    Last Rx Written: 11/26/2024    Last Office Visit: Telemedicine 11/11/2024  Sent [email or web address] message to patient to establish care

## 2025-05-22 NOTE — TELEPHONE ENCOUNTER
Patient contacted and made aware of Dr. Rogers's note. She declined scheduling visit at this time when offered, she states she will schedule the visit on her own. Patient verbalized understanding. No further questions or concerns at this time.

## 2025-06-11 ENCOUNTER — OFFICE VISIT (OUTPATIENT)
Dept: INTERNAL MEDICINE CLINIC | Facility: CLINIC | Age: 40
End: 2025-06-11
Payer: COMMERCIAL

## 2025-06-11 VITALS
WEIGHT: 190.38 LBS | SYSTOLIC BLOOD PRESSURE: 126 MMHG | OXYGEN SATURATION: 97 % | HEART RATE: 115 BPM | DIASTOLIC BLOOD PRESSURE: 88 MMHG | TEMPERATURE: 98 F | HEIGHT: 63.8 IN | RESPIRATION RATE: 20 BRPM | BODY MASS INDEX: 32.9 KG/M2

## 2025-06-11 DIAGNOSIS — M79.671 BILATERAL FOOT PAIN: ICD-10-CM

## 2025-06-11 DIAGNOSIS — M79.672 BILATERAL FOOT PAIN: ICD-10-CM

## 2025-06-11 DIAGNOSIS — M25.551 BILATERAL HIP PAIN: ICD-10-CM

## 2025-06-11 DIAGNOSIS — E55.9 VITAMIN D DEFICIENCY: ICD-10-CM

## 2025-06-11 DIAGNOSIS — Z12.11 ENCOUNTER FOR SCREENING COLONOSCOPY: ICD-10-CM

## 2025-06-11 DIAGNOSIS — Z12.4 ENCOUNTER FOR SCREENING FOR CERVICAL CANCER: ICD-10-CM

## 2025-06-11 DIAGNOSIS — Z12.31 ENCOUNTER FOR SCREENING MAMMOGRAM FOR BREAST CANCER: ICD-10-CM

## 2025-06-11 DIAGNOSIS — M25.552 BILATERAL HIP PAIN: ICD-10-CM

## 2025-06-11 DIAGNOSIS — F41.8 DEPRESSION WITH ANXIETY: ICD-10-CM

## 2025-06-11 DIAGNOSIS — Z00.01 ENCOUNTER FOR GENERAL ADULT MEDICAL EXAMINATION WITH ABNORMAL FINDINGS: Primary | ICD-10-CM

## 2025-06-11 PROCEDURE — 3079F DIAST BP 80-89 MM HG: CPT | Performed by: INTERNAL MEDICINE

## 2025-06-11 PROCEDURE — 99395 PREV VISIT EST AGE 18-39: CPT | Performed by: INTERNAL MEDICINE

## 2025-06-11 PROCEDURE — 3008F BODY MASS INDEX DOCD: CPT | Performed by: INTERNAL MEDICINE

## 2025-06-11 PROCEDURE — 99214 OFFICE O/P EST MOD 30 MIN: CPT | Performed by: INTERNAL MEDICINE

## 2025-06-11 PROCEDURE — 3074F SYST BP LT 130 MM HG: CPT | Performed by: INTERNAL MEDICINE

## 2025-06-11 RX ORDER — ALPRAZOLAM 0.5 MG
0.5 TABLET ORAL 2 TIMES DAILY PRN
Qty: 20 TABLET | Refills: 0 | Status: SHIPPED | OUTPATIENT
Start: 2025-06-11

## 2025-06-11 NOTE — ASSESSMENT & PLAN NOTE
Patient continues on Wellbutrin 150 mg twice daily.  She is concerned that she has ADHD.  She was given referral to [facility], who should be able to set her up with psychiatric resources.  For now, continue Wellbutrin 150 mg twice daily, and Xanax as needed.  Orders:    CBC With Differential With Platelet; Future    Comp Metabolic Panel (14); Future    Lipid Panel; Future    TSH W Reflex To Free T4; Future    Hemoglobin A1C; Future    Vitamin D; Future    ALPRAZolam 0.5 MG Oral Tab; Take 1 tablet (0.5 mg total) by mouth 2 (two) times daily as needed.    [facility]

## 2025-06-11 NOTE — PROGRESS NOTES
REASON FOR VISIT      Karlene Richter is a 40 year old female who presents for  Annual Physical Exam (not Medicare, or ABN signed for Medicare non covered service) and scheduled follow up of multiple significant but stable problems.     HCM   - mammogram: Start at 41  - colon: Started 46  - PAP: Up-to-date  - DEXA: Start at 66  - Labs: Ordered today  - imm: Flu shot? COVID booster? Shingles? PNA?  Needs flu shot in the fall, needs another COVID-vaccine, tetanus up-to-date, needs shingles and pneumonia at 51, RSV at 61.  - depression:     Former Dr. Au patient.    Patient states that she has been having bilateral foot pain, on the lateral side of both feet. Pain has been going for a couple years, but getting worse. Never red, hot, swollen.     She has been having bilateral hip pain, L > R. She has been going to a \"stretch lab\" where she is guided through stretching exercises are tight. Sitting makes the hip pain worse.     Patient has a history of depression and anxiety.  She did discuss this with Dr. Bae previously, and has seen psychiatry at least once.  When she last saw Dr. Au in May 2024, she was concerned that she had ADHD.  When she saw psychiatry, neuropsych testing was recommended, the patient did not do it because it was not covered by insurance.  At the time, sertraline was added to Wellbutrin, but patient developed panic attacks.  Therefore, she stopped the sertraline, and at that time was continued on Wellbutrin 150 mg twice daily.  She uses alprazolam as needed.  When she saw Dr. Au on 11/11/2024, he recommended that she increase the Wellbutrin to 300 mg in the morning, and remain on 150 in the evening.  She was recommended to follow-up with psychiatry once more.     Past Medical History     Past Medical History[1]     Past Surgical History     Past Surgical History[2]     Family History     Family History[3]    Social History     Short Social Hx on File[4]    Tobacco:   She currently has  tobacco smoking, smokeless, or e-cigarette status listed as never assessed or unknown.    Please update the information in the Tobacco history section to reflect the true status, and refresh this smartlink.    CAGE Alcohol Screen:   Cage screening not needed because reported NO to Alcohol use on social history.    Depression Screening (PHQ):  PHQ-2/9 not done in last week, please ask questions. Last done              MONICA-7 Total Score                 Allergies     Allergies[5]    Current Medications     Current Outpatient Medications   Medication Sig Dispense Refill    Etonogestrel-Ethinyl Estradiol (NUVARING) 0.12-0.015 MG/24HR Vaginal Ring Place 1 Ring vaginally As Directed. Insert ring in vagina x 3 weeks, then remove and replace with new ring for 3 weeks, then remove for one week. 3 each 4    valACYclovir 500 MG Oral Tab Take 1 tablet (500 mg total) by mouth 2 (two) times daily. **Appointment needed for further refills. (Patient taking differently: Take 1 tablet (500 mg total) by mouth as needed. **Appointment needed for further refills.) 6 tablet 0    ALPRAZolam 0.5 MG Oral Tab Take 1 tablet (0.5 mg total) by mouth 2 (two) times daily as needed. 20 tablet 0    buPROPion  MG Oral Tablet 12 Hr Take 1 tablet (150 mg total) by mouth 2 (two) times daily. 180 tablet 3    ibuprofen 200 MG Oral Tab Take 1 tablet (200 mg total) by mouth every 6 (six) hours as needed for Pain.      fluticasone propionate 50 MCG/ACT Nasal Suspension 2 sprays by Each Nare route daily. (Patient not taking: Reported on 6/11/2025) 3 each 3     No current facility-administered medications for this visit.       Screenings     History/Other:   Fall Risk Assessment:    (Incomplete)        Cognitive Assessment:    (Incomplete)  Tip  Cognitive assessment incomplete. Refresh to ensure screening pulls in; if not,click link above to assess, then refresh:[id number]}      Functional Ability/Status:    (Incomplete)      Immunization History    Administered Date(s) Administered    Covid-19 Vaccine Pfizer 30 mcg/0.3 ml 04/14/2021, 05/05/2021    FLULAVAL 6 months & older 0.5 ml Prefilled syringe (44025) 10/16/2020, 09/23/2022    FLUZONE 6 months and older PFS 0.5 ml (66125) 10/16/2020, 12/11/2023    Influenza 10/16/2016, 10/30/2017, 10/10/2019    TDAP 10/16/2020       Health Maintenance   Topic Date Due    COVID-19 Vaccine (3 - 2024-25 season) 09/01/2024    Annual Physical  12/11/2024    Influenza Vaccine (Season Ended) 10/01/2025    Pap Smear  05/07/2028    DTaP,Tdap,and Td Vaccines (2 - Td or Tdap) 10/16/2030    Annual Depression Screening  Completed    Pneumococcal Vaccine: Birth to 50yrs  Aged Out    Meningococcal B Vaccine  Aged Out         Review of Systems     GENERAL HEALTH: feels well otherwise  SKIN: denies any unusual skin lesions or rashes  RESPIRATORY: denies shortness of breath with exertion  CARDIOVASCULAR: denies chest pain on exertion  GI: denies abdominal pain and denies heartburn  : denies any burning with urination, urinary frequency or urgency  NEURO: denies headaches, numbness or tingling, mental status changes  PSYCH: denies depressed mood, anxiety  MUSC: denies muscle aches, joint pain      Physical Exam     EXAM:  /88 (BP Location: Right arm, Patient Position: Sitting, Cuff Size: adult)   Pulse 115   Temp 97.7 °F (36.5 °C) (Temporal)   Resp 20   Ht 5' 3.8\" (1.621 m)   Wt 190 lb 6.4 oz (86.4 kg)   LMP 06/06/2025 (Approximate)   SpO2 97%   BMI 32.89 kg/m²   >   BP Readings from Last 3 Encounters:   06/11/25 126/88   05/07/25 130/86   12/11/23 118/78     Patient's last menstrual period was 06/06/2025 (approximate).  GENERAL: well developed, well nourished, in no apparent distress  SKIN: no rashes, no suspicious lesions  HEENT: atraumatic, normocephalic, ears and throat are clear  EYES:PERRLA, EOMI, conjunctiva are clear.   NECK: supple, no adenopathy, no bruits  CHEST: no chest tenderness  BREAST: deferred   LUNGS:  clear to auscultation  CARDIO: RRR without murmur  GI: good BS's, no masses, HSM or tenderness  :deferred  RECTAL: deferred  MUSCULOSKELETAL: back is not tender, FROM of the back  EXTREMITIES: no cyanosis, clubbing or edema  NEURO: Oriented times three, cranial nerves are intact, motor and sensory are grossly intact  FOOT: deferred      Assessment and Plan     Karlene Richter is a 40 year old female who presents for an Annual Health Assessment.     Assessment & Plan  Encounter for general adult medical examination with abnormal findings  Age appropriate screenings and vaccinations discussed. Counseled on healthy diet, exercise, sleep hygiene. Patient advised that any additional concerns not included in a routine physical may result in additional charges and/or a copay. Patient verbally acknowledged this information and agreed to proceed.    Orders:    CBC With Differential With Platelet; Future    Comp Metabolic Panel (14); Future    Lipid Panel; Future    TSH W Reflex To Free T4; Future    Hemoglobin A1C; Future    Vitamin D; Future    Encounter for screening colonoscopy  Patient occasionally has bloody stools, but the blood is on the stool, and only occurs when she has hard or bulky bowel movements.  She has been told that she has hemorrhoids and an anal fissure in the past.  Likely, the bloody stools represent hemorrhoidal bleeding.  Would plan to have screening colonoscopy done at 46 unless rectal bleeding changes in nature.       Encounter for screening mammogram for breast cancer  Gynecologist is already given her referral for colonoscopy, will have it done after her 41th birthday.       Encounter for screening for cervical cancer  Up-to-date with gynecology.       Depression with anxiety  Patient continues on Wellbutrin 150 mg twice daily.  She is concerned that she has ADHD.  She was given referral to [facility], who should be able to set her up with psychiatric resources.  For now, continue Wellbutrin 150 mg  twice daily, and Xanax as needed.  Orders:    CBC With Differential With Platelet; Future    Comp Metabolic Panel (14); Future    Lipid Panel; Future    TSH W Reflex To Free T4; Future    Hemoglobin A1C; Future    Vitamin D; Future    ALPRAZolam 0.5 MG Oral Tab; Take 1 tablet (0.5 mg total) by mouth 2 (two) times daily as needed.    [facility]    B[facility] hip pain  I do not think an x-ray would be of a lot of utility in this patient, because I do not suspect dislocation, fracture, or advanced osteoarthritis in a patient of 40 years old.  Would recommend that she start physical therapy.  However, if pain continues despite physical therapy, then would have the patient follow-up with physiatry.  Orders:    CBC With Differential With Platelet; Future    Comp Metabolic Panel (14); Future    Lipid Panel; Future    TSH W Reflex To Free T4; Future    Hemoglobin A1C; Future    Vitamin D; Future    Physical Therapy Referral - [facility]    Bilateral foot pain  Follow-up with podiatry.  Orders:    CBC With Differential With Platelet; Future    Comp Metabolic Panel (14); Future    Lipid Panel; Future    TSH W Reflex To Free T4; Future    Hemoglobin A1C; Future    Vitamin D; Future    Podiatry Referral - In [facility]    Vitamin D deficiency  Recheck vitamin D.  Orders:    CBC With Differential With Platelet; Future    Comp Metabolic Panel (14); Future    Lipid Panel; Future    TSH W Reflex To Free T4; Future    Hemoglobin A1C; Future    Vitamin D; Future         The patient indicates understanding of these issues and agrees to the plan.  The patient is asked to return in 6 months for office visit  Diet counseling perfomed  Exercise counseling perfomed    Electronically signed by Monique Rogers MD 06/11/25         [1]   Past Medical History:   Depression with anxiety    Genital herpes    Hypercholesterolemia    Migraines   [2]   Past Surgical History:  Procedure Laterality Date    [place] teeth removed  01/30/2016    [3]   Family History  Problem Relation Age of Onset    Breast Cancer Maternal Aunt     Diabetes Maternal Grandfather     Hypertension Maternal Grandfather     Diabetes Maternal Grandmother     No Known Problems Mother     Heart Attack Maternal Cousin    [4]   Social History  Socioeconomic History    Marital status:    Tobacco Use    Smoking status: Never    Smokeless tobacco: Never   Vaping Use    Vaping status: Never Used   Substance and Sexual Activity    Alcohol use: Yes     Comment: socially    Drug use: Never    Sexual activity: Yes     Partners: Male     Birth control/protection: Inserts     Comment: nuva ring   Other Topics Concern    Caffeine Concern Yes     Comment: coffee, 1 cup   Social History Narrative    ** Merged History Encounter **          Social Drivers of Health     Food Insecurity: No Food Insecurity (6/11/2025)    NCSS - Food Insecurity     Worried About Running Out of Food in the Last Year: No     Ran Out of Food in the Last Year: No   Transportation Needs: No Transportation Needs (6/11/2025)    NCSS - Transportation     Lack of Transportation: No   Housing Stability: Not At Risk (6/11/2025)    NCSS - Housing/Utilities     Has Housing: Yes     Worried About Losing Housing: No     Unable to Get Utilities: No   [5]   Allergies  Allergen Reactions    Seasonal OTHER (SEE COMMENTS)     Sneezing.

## 2025-06-19 ENCOUNTER — LAB ENCOUNTER (OUTPATIENT)
Dept: LAB | Age: 40
End: 2025-06-19
Attending: INTERNAL MEDICINE
Payer: COMMERCIAL

## 2025-06-19 DIAGNOSIS — F41.8 DEPRESSION WITH ANXIETY: ICD-10-CM

## 2025-06-19 DIAGNOSIS — M79.672 BILATERAL FOOT PAIN: ICD-10-CM

## 2025-06-19 DIAGNOSIS — M79.671 BILATERAL FOOT PAIN: ICD-10-CM

## 2025-06-19 DIAGNOSIS — M25.551 BILATERAL HIP PAIN: ICD-10-CM

## 2025-06-19 DIAGNOSIS — Z00.01 ENCOUNTER FOR GENERAL ADULT MEDICAL EXAMINATION WITH ABNORMAL FINDINGS: ICD-10-CM

## 2025-06-19 DIAGNOSIS — E55.9 VITAMIN D DEFICIENCY: ICD-10-CM

## 2025-06-19 DIAGNOSIS — M25.552 BILATERAL HIP PAIN: ICD-10-CM

## 2025-06-19 LAB
ALBUMIN SERPL-MCNC: 4.8 G/DL (ref 3.2–4.8)
ALBUMIN/GLOB SERPL: 1.8 {RATIO} (ref 1–2)
ALP LIVER SERPL-CCNC: 95 U/L (ref 37–98)
ALT SERPL-CCNC: 24 U/L (ref 10–49)
ANION GAP SERPL CALC-SCNC: 10 MMOL/L (ref 0–18)
AST SERPL-CCNC: 23 U/L (ref ?–34)
BASOPHILS # BLD AUTO: 0.06 X10(3) UL (ref 0–0.2)
BASOPHILS NFR BLD AUTO: 0.7 %
BILIRUB SERPL-MCNC: 0.4 MG/DL (ref 0.3–1.2)
BUN BLD-MCNC: 10 MG/DL (ref 9–23)
CALCIUM BLD-MCNC: 9.6 MG/DL (ref 8.7–10.6)
CHLORIDE SERPL-SCNC: 105 MMOL/L (ref 98–112)
CHOLEST SERPL-MCNC: 230 MG/DL (ref ?–200)
CO2 SERPL-SCNC: 26 MMOL/L (ref 21–32)
CREAT BLD-MCNC: 0.95 MG/DL (ref 0.55–1.02)
EGFRCR SERPLBLD CKD-EPI 2021: 78 ML/MIN/1.73M2 (ref 60–?)
EOSINOPHIL # BLD AUTO: 0.1 X10(3) UL (ref 0–0.7)
EOSINOPHIL NFR BLD AUTO: 1.1 %
ERYTHROCYTE [DISTWIDTH] IN BLOOD BY AUTOMATED COUNT: 13.8 %
EST. AVERAGE GLUCOSE BLD GHB EST-MCNC: 103 MG/DL (ref 68–126)
FASTING PATIENT LIPID ANSWER: YES
FASTING STATUS PATIENT QL REPORTED: YES
GLOBULIN PLAS-MCNC: 2.6 G/DL (ref 2–3.5)
GLUCOSE BLD-MCNC: 95 MG/DL (ref 70–99)
HBA1C MFR BLD: 5.2 % (ref ?–5.7)
HCT VFR BLD AUTO: 41.8 % (ref 35–48)
HDLC SERPL-MCNC: 87 MG/DL (ref 40–59)
HGB BLD-MCNC: 14 G/DL (ref 12–16)
IMM GRANULOCYTES # BLD AUTO: 0.04 X10(3) UL (ref 0–1)
IMM GRANULOCYTES NFR BLD: 0.4 %
LDLC SERPL CALC-MCNC: 130 MG/DL (ref ?–100)
LYMPHOCYTES # BLD AUTO: 1.64 X10(3) UL (ref 1–4)
LYMPHOCYTES NFR BLD AUTO: 18 %
MCH RBC QN AUTO: 28.8 PG (ref 26–34)
MCHC RBC AUTO-ENTMCNC: 33.5 G/DL (ref 31–37)
MCV RBC AUTO: 86 FL (ref 80–100)
MONOCYTES # BLD AUTO: 0.77 X10(3) UL (ref 0.1–1)
MONOCYTES NFR BLD AUTO: 8.5 %
NEUTROPHILS # BLD AUTO: 6.49 X10 (3) UL (ref 1.5–7.7)
NEUTROPHILS # BLD AUTO: 6.49 X10(3) UL (ref 1.5–7.7)
NEUTROPHILS NFR BLD AUTO: 71.3 %
NONHDLC SERPL-MCNC: 143 MG/DL (ref ?–130)
OSMOLALITY SERPL CALC.SUM OF ELEC: 291 MOSM/KG (ref 275–295)
PLATELET # BLD AUTO: 389 10(3)UL (ref 150–450)
POTASSIUM SERPL-SCNC: 4.3 MMOL/L (ref 3.5–5.1)
PROT SERPL-MCNC: 7.4 G/DL (ref 5.7–8.2)
RBC # BLD AUTO: 4.86 X10(6)UL (ref 3.8–5.3)
SODIUM SERPL-SCNC: 141 MMOL/L (ref 136–145)
TRIGL SERPL-MCNC: 78 MG/DL (ref 30–149)
TSI SER-ACNC: 2.01 UIU/ML (ref 0.55–4.78)
VIT D+METAB SERPL-MCNC: 26.4 NG/ML (ref 30–100)
VLDLC SERPL CALC-MCNC: 14 MG/DL (ref 0–30)
WBC # BLD AUTO: 9.1 X10(3) UL (ref 4–11)

## 2025-06-19 PROCEDURE — 82306 VITAMIN D 25 HYDROXY: CPT

## 2025-06-19 PROCEDURE — 80053 COMPREHEN METABOLIC PANEL: CPT

## 2025-06-19 PROCEDURE — 84443 ASSAY THYROID STIM HORMONE: CPT

## 2025-06-19 PROCEDURE — 80061 LIPID PANEL: CPT

## 2025-06-19 PROCEDURE — 83036 HEMOGLOBIN GLYCOSYLATED A1C: CPT

## 2025-06-19 PROCEDURE — 36415 COLL VENOUS BLD VENIPUNCTURE: CPT

## 2025-06-19 PROCEDURE — 85025 COMPLETE CBC W/AUTO DIFF WBC: CPT

## 2025-07-08 ENCOUNTER — OFFICE VISIT (OUTPATIENT)
Dept: PODIATRY CLINIC | Facility: CLINIC | Age: 40
End: 2025-07-08

## 2025-07-08 ENCOUNTER — PATIENT MESSAGE (OUTPATIENT)
Dept: INTERNAL MEDICINE CLINIC | Facility: CLINIC | Age: 40
End: 2025-07-08

## 2025-07-08 DIAGNOSIS — M72.2 PLANTAR FASCIITIS, BILATERAL: ICD-10-CM

## 2025-07-08 DIAGNOSIS — M76.71 PERONEAL TENDINITIS OF RIGHT LOWER EXTREMITY: Primary | ICD-10-CM

## 2025-07-08 DIAGNOSIS — M25.551 BILATERAL HIP PAIN: ICD-10-CM

## 2025-07-08 DIAGNOSIS — M25.552 BILATERAL HIP PAIN: ICD-10-CM

## 2025-07-08 DIAGNOSIS — M76.71 PERONEAL TENDONITIS, RIGHT: Primary | ICD-10-CM

## 2025-07-08 PROCEDURE — 99204 OFFICE O/P NEW MOD 45 MIN: CPT | Performed by: PODIATRIST

## 2025-07-08 NOTE — PROGRESS NOTES
Reason for Visit      Karlene Richter is a 40 year old female presents today complaining of bilateral foot and ankle pain.     History of Present Illness     Patient presents to clinic today complaining of bilateral foot and ankle pain.  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  Patient states she has pain on the plantar aspect of her right foot as well as concerns with her right ankle giving out.  Patient is currently going to physical therapy for right hip pain    The following portions of the patient's history were reviewed and updated as appropriate: allergies, current medications, past family history, past medical history, past social history, past surgical history and problem list.    Allergies[1]    Medications - Current[2]    There are no discontinued medications.    Problem List[3]    Past Medical History[4]    Past Surgical History[5]    Family History[6]    Social History     Occupational History    Not on file   Tobacco Use    Smoking status: Never    Smokeless tobacco: Never   Vaping Use    Vaping status: Never Used   Substance and Sexual Activity    Alcohol use: Yes     Comment: socially    Drug use: Never    Sexual activity: Yes     Partners: Male     Birth control/protection: Inserts     Comment: nuva ring       ROS      Constitutional: negative for chills, fevers and sweats  Gastrointestinal: negative for abdominal pain, diarrhea, nausea and vomiting  Genitourinary:negative for dysuria and hematuria  Musculoskeletal:negative for arthralgias and muscle weakness  Neurological: negative for paresthesia and weakness  All others reviewed and negative.      Physical Exam     LE PHYSICAL EXAM    Constitution: Well-developed and well-nourished. Gait appears normal. No apparent distress. Alert and oriented to person, place, and time.  Integument: There are no  varicosities. Skin appears moist, warm, and supple with positive hair growth. There are no color changes. No open lesions. No macerations, No Hyperkeratotic lesions.  Vascular examination: Dorsalis pedis and posterior tibial pulses are strong bilaterally with capillary filling time less than 3 seconds to all digits. There is no peripheral edema..  Neurological Sensorium: Grossly intact to sharp/dull. Vibratory: Intact.  Musculoskeletal:   5/5 pedal muscle strength b/l     Pain to palpation along the medial band of the plantar fascia as well as the lateral band.  No pain with eversion against resistance.  No pain on palpation along the fifth metatarsal.  Vitals: LMP 06/06/2025 (Approximate)       Assessment and Plan     Encounter Diagnoses   Name Primary?    Peroneal tendonitis, right Yes    Plantar fasciitis, bilateral    Plantar Fasciitis Treatment Protocol    1. The etiology and contributing factors of plantar fasciitis was explained to the patient in great detail. The treatment algorithm was explained to the patient from conservative to surgical interventions.  2. Patient was given an educational handout describing both the stretching and icing exercises to be performed a minimal of twice a day to bilateral lower extremities.   3. Patient was educated on proper supportive shoe gear as well as both OTC and custom made orthodics in the treatment of this condition.  4. Patient was instructed to take anti-inflammatory medication on a scheduled basis for the next 10 days.   5. Discussed further treatment options and medical imaging if problem does not resolve by the next office visit.  6. Patient to RTC 3-4 weeks or sooner if problems arise.     - Given the chronicity of her symptoms we will order an MRI to assess her plantar fascia as well as her peroneal tendon and fifth metatarsal.  Patient already going to physical therapy for her hip and we will send orders for physical therapy to address her ankle and foot as  well.  Discussed protection immobilization as she does have a tendon tear or stress fracture discussed aggressive physical therapy if it is just chronic plantar fasciitis    Patient was instructed to call the office or on-call podiatric physician immediately with any issues or concerns before the next scheduled visit. Patient to follow-up in clinic in after MRI      Vilma Ramirez DPM, MONE.[email or web address], HANS  Diplomat, American Board of Foot and Ankle Surgery  Certified in Foot and Rearfoot/Ankle Reconstruction  Fellow of the American College of Foot and Ankle Surgeons  Fellowship Trained Foot and Ankle Surgeon   [facility]     7/8/2025    1:32 PM       [1]   Allergies  Allergen Reactions    Seasonal OTHER (SEE COMMENTS)     Sneezing.   [2]   Current Outpatient Medications:     ALPRAZolam 0.5 MG Oral Tab, Take 1 tablet (0.5 mg total) by mouth 2 (two) times daily as needed., Disp: 20 tablet, Rfl: 0    Etonogestrel-Ethinyl Estradiol (NUVARING) 0.12-0.015 MG/24HR Vaginal Ring, Place 1 Ring vaginally As Directed. Insert ring in vagina x 3 weeks, then remove and replace with new ring for 3 weeks, then remove for one week., Disp: 3 each, Rfl: 4    valACYclovir 500 MG Oral Tab, Take 1 tablet (500 mg total) by mouth 2 (two) times daily. **Appointment needed for further refills. (Patient taking differently: Take 1 tablet (500 mg total) by mouth as needed. **Appointment needed for further refills.), Disp: 6 tablet, Rfl: 0    buPROPion  MG Oral Tablet 12 Hr, Take 1 tablet (150 mg total) by mouth 2 (two) times daily., Disp: 180 tablet, Rfl: 3    fluticasone propionate 50 MCG/ACT Nasal Suspension, 2 sprays by Each Nare route daily., Disp: 3 each, Rfl: 3    ibuprofen 200 MG Oral Tab, Take 1 tablet (200 mg total) by mouth every 6 (six) hours as needed for Pain., Disp: , Rfl:   [3]   Patient Active Problem List  Diagnosis    Migraines    Depression with anxiety    AR (allergic rhinitis)    Hypercholesterolemia     Noninflammatory disorder of vulva and perineum    Symptom associated with female genital organs    Vaginitis and vulvovaginitis   [4]   Past Medical History:   Depression with anxiety    Genital herpes    Hypercholesterolemia    Migraines   [5]   Past Surgical History:  Procedure Laterality Date    [place] teeth removed  01/30/2016   [6]   Family History  Problem Relation Age of Onset    Breast Cancer Maternal Aunt     Diabetes Maternal Grandfather     Hypertension Maternal Grandfather     Diabetes Maternal Grandmother     No Known Problems Mother     Heart Attack Maternal Cousin

## 2025-07-11 ENCOUNTER — TELEPHONE (OUTPATIENT)
Dept: PHYSICAL THERAPY | Facility: HOSPITAL | Age: 40
End: 2025-07-11

## 2025-07-14 ENCOUNTER — APPOINTMENT (OUTPATIENT)
Dept: PHYSICAL THERAPY | Age: 40
End: 2025-07-14
Attending: INTERNAL MEDICINE

## 2025-07-14 ENCOUNTER — OFFICE VISIT (OUTPATIENT)
Dept: PHYSICAL THERAPY | Age: 40
End: 2025-07-14
Attending: INTERNAL MEDICINE
Payer: COMMERCIAL

## 2025-07-14 DIAGNOSIS — M76.71 PERONEAL TENDINITIS OF RIGHT LOWER EXTREMITY: Primary | ICD-10-CM

## 2025-07-14 DIAGNOSIS — M25.551 BILATERAL HIP PAIN: ICD-10-CM

## 2025-07-14 DIAGNOSIS — M72.2 PLANTAR FASCIITIS, BILATERAL: ICD-10-CM

## 2025-07-14 DIAGNOSIS — M25.552 BILATERAL HIP PAIN: ICD-10-CM

## 2025-07-14 PROCEDURE — 97162 PT EVAL MOD COMPLEX 30 MIN: CPT

## 2025-07-14 PROCEDURE — 97530 THERAPEUTIC ACTIVITIES: CPT

## 2025-07-14 NOTE — PROGRESS NOTES
LOWER EXTREMITY EVALUATION:     Diagnosis:   Peroneal tendinitis of right lower extremity (M76.71)  Plantar fasciitis, bilateral (M72.2)  Bilateral hip pain (M25.551,M25.552) Patient:  Karlene Richter (40 year old, female)        Referring Provider: Monique Rogers  Today's Date   7/14/2025    Precautions:  None   Date of Evaluation: 07/14/25  Next MD visit: December 2025  Date of Surgery: n/a     PATIENT SUMMARY     Summary of chief complaints: bilat foot/heel pain and bilat hip pain and stiffness  History of current condition: Pt reports that she has had chronic foot pain for many years at the bottom of her feet and heels.  Pain has been getting worse.  She started using a walking pad for exercise to lose weight about 1 year ago and would walk 1 hour/day.  She also started having lateral right foot pain early this summer and thinks it started from wearing a certain pair of shoes.  She also has been feeling like the right ankle may give out on her.  She is getting an MRI of her right foot tomorrow morning.  She has tried using a frozen bottle to roll her feet.  Goes to stretch lab, which seems to help.  Pt also reports bilat hip pain (R>L) especially upon standing up after sitting for a long time.  They feel stuck and she can't move them.  She first started noticing this 9 years ago but it has been worsening.  Pain is at the lateral hips.  Walking relieves the stiffness but she does have hip pain with walking.  Hips are also very stiff upon waking in the morning.   Pain level: current 0 /10 (0/10 feet; 0/10 hips), at best 0 /10 (0/10 feet and hips), at worst 7 /10 (7/10 both feet and hips)  Description of symptoms: bilat foot/heel pain and bilat hip pain and stiffness   Occupation: volunteers at cat shelter; - sitting   Leisure activities/Hobbies: Jogging, walking, swimming   Prior level of function: Has had foot and hip pain for many years but could walk and jog with less pain over 1 year ago  Current  limitations: Feet: Walking, jogging, negotiating up/down stairs, prolonged standing. Hips: prolonged sitting, walking, negotiating up stairs  Pt goals: To be able to exercise (walk on walking pad) daily; have hip pain go away.  Past medical history was reviewed with Karlene.  Significant findings include: none  Imaging/Tests: MRI scheduled for 7/15/25   Karlene  has a past medical history of Depression with anxiety, Genital herpes, Hypercholesterolemia, and Migraines.  She  has a past surgical history that includes wisdom teeth removed (01/30/2016).    ASSESSMENT  Karlene presents to physical therapy evaluation with primary c/o bilat foot/heel pain and bilat hip pain and stiffness. The results of the objective tests and measures show decreased bilat hip and and right ankle strength, decreased flexibilty of bilat hamstrings, hip flexors, quadriceps, piriformis, gastroc and ITB, decreased balance, gait deviations. Functional deficits include but are not limited to Feet: Walking, jogging, negotiating up/down stairs, prolonged standing. Hips: prolonged sitting, walking, negotiating up stairs. Signs and symptoms are consistent with diagnosis of Peroneal tendinitis of right lower extremity (M76.71)  Plantar fasciitis, bilateral (M72.2)  Bilateral hip pain (M25.551,M25.552). Pt and PT discussed evaluation findings, pathology, POC and HEP.  Pt voiced understanding and performs HEP correctly without reported pain. Skilled Physical Therapy is medically necessary to address the above impairments and reach functional goals.    OBJECTIVE:      Musculoskeletal  Palpation: Tenderness at bilat Greater trochanter, ITB, TFL, left glut med and left piriformis;  tenderness bilat med calcaneal tubercle, bilat plantar fascia, achilles tendon and right 5th metatarsal    Special Tests:Hip: LEIDA neg, FADIR neg, Hip scour neg     ROM and Strength: (* denotes performed with pain)  Hip   MMT (-/5)    R L     Flex (L2) 5 5*     Ext  5 5     Abd 3- 3-      ER 5 4*     IR 5 5    ,   Knee   MMT (-/5)    R L     Flex 5 5     Ext (L3) 5 5     ,   Ankle/Foot   ROM MMT (-/5)    R L R L     PF WNL WNL 4/10* (stopped due to lateral foot pain) 6/10     DF (L4) 5 5 5 5     Inversion WNL WNL 4- 5     Eversion WNL WNL 5 5     Grt Toe Ext (L5)     5 5       Flexibility:    LE Flexibility R L     Hip Flexor min restricted min restricted     Hamstrings WNL WNL     ITB mod restricted mod restricted     Piriformis min restricted mod restricted     Quads mod restricted mod restricted     Gastroc-soleus min restricted min restricted        Balance and Functional Mobility:  Gait: pt ambulates on level ground with -- (antalgic, right LE ER, left LE IR, decreased ankle DF bilat)   SLS: R 7 sec,  L 10 sec       Today's Treatment and Response:   Pt education was provided on exam findings, treatment diagnosis, treatment plan, expectations, and prognosis.    Today's Treatment       7/14/2025   LE Treatment   Therapeutic Activity Pt education regarding eval findings and PT POC  Discussed importance of standing up at least every 20-30 minutes while working     Therapeutic Activity Minutes 8   Evaluation Minutes 40   Total Time Of Timed Procedures 8   Total Time Of Service-Based Procedures 40   Total Treatment Time 48        Patient was instructed in and issued a HEP for:  To be instructed at next visit.    Charges:  PT EVAL: Moderate Complexity, 1 Ther act  In agreement with evaluation findings and clinical rationale, this evaluation involved MODERATE COMPLEXITY decision making due to 1-2 personal factors/comorbidities, 3 or more body structures involved/activity limitations, and evolving symptoms as documented in the evaluation.                                                                                                                PLAN OF CARE:      Goals: (to be met in 10 visits)    Not Met Progress Toward Partially Met Met   Pt will improve hip ABD strength to 4+/5 to increase ease  with standing and walking. [] [] [] []   Pt will improve functional hip and ankle strength to report ability to ascend/descend 1 flight of stairs reciprocally with use of handrail. [] [] [] []   Pt will demonstrate improved SLS to >10 seconds NIC to promote safety and decrease risk of falls on uneven surfaces such as grass and gravel. [] [] [] []   Pt will improve right ankle strength to 5/5 for improve ankle stability and ability to perform prolonged walking, [] [] [] []   Pt will be independent and compliant with comprehensive HEP to maintain progress achieved in PT. [] [] [] []          Frequency / Duration: Patient will be seen 2x/week or a total of 10  visits over a 90 day period. Treatment will include: Manual Therapy; Neuromuscular Re-education; Therapeutic Activities; Therapeutic Exercise; Home Exercise Program instruction    Education or treatment limitation: None   Rehab Potential: good     LEFS Score  LEFS Score: (Patient-Rptd) 43.75 % (7/13/2025 10:31 PM)      Patient/Family/Caregiver was advised of these findings, precautions, and treatment options and has agreed to actively participate in planning and for this course of care.    Thank you for your referral. Please co-sign or sign and return this letter via fax as soon as possible to [phone]. If you have any questions, please contact me at Dept: [phone]    Sincerely,  Electronically signed by therapist: Cecilia Skelton PT  Physician's certification required: Yes  I certify the need for these services furnished under this plan of treatment and while under my care.    X___________________________________________________ Date____________________    Certification From: 7/14/2025  To: 10/12/2025

## 2025-07-15 ENCOUNTER — HOSPITAL ENCOUNTER (OUTPATIENT)
Dept: MRI IMAGING | Age: 40
Discharge: HOME | End: 2025-07-15
Attending: PODIATRIST
Payer: COMMERCIAL

## 2025-07-15 ENCOUNTER — HOSPITAL ENCOUNTER (OUTPATIENT)
Dept: GENERAL RADIOLOGY | Age: 40
Discharge: HOME | End: 2025-07-15
Attending: RADIOLOGY
Payer: COMMERCIAL

## 2025-07-15 DIAGNOSIS — M76.71 PERONEAL TENDONITIS, RIGHT: ICD-10-CM

## 2025-07-15 DIAGNOSIS — M76.71 PERONEAL TENDINITIS, RIGHT: ICD-10-CM

## 2025-07-15 DIAGNOSIS — M72.2 PLANTAR FASCIITIS, BILATERAL: ICD-10-CM

## 2025-07-15 PROCEDURE — 73721 MRI JNT OF LWR EXTRE W/O DYE: CPT | Performed by: PODIATRIST

## 2025-07-15 PROCEDURE — 73600 X-RAY EXAM OF ANKLE: CPT | Performed by: RADIOLOGY

## 2025-07-16 ENCOUNTER — APPOINTMENT (OUTPATIENT)
Dept: PHYSICAL THERAPY | Age: 40
End: 2025-07-16
Attending: INTERNAL MEDICINE

## 2025-07-16 ENCOUNTER — OFFICE VISIT (OUTPATIENT)
Dept: PHYSICAL THERAPY | Age: 40
End: 2025-07-16
Attending: INTERNAL MEDICINE
Payer: COMMERCIAL

## 2025-07-16 PROCEDURE — 97140 MANUAL THERAPY 1/> REGIONS: CPT

## 2025-07-16 PROCEDURE — 97110 THERAPEUTIC EXERCISES: CPT

## 2025-07-16 NOTE — PROGRESS NOTES
Patient: Karlene Richter (40 year old, female) Referring Provider:  Insurance:   Diagnosis: Peroneal tendinitis of right lower extremity (M76.71)  Plantar fasciitis, bilateral (M72.2)  Bilateral hip pain (M25.551,M25.552) Monique Rogers  [facility]   Date of Surgery: n/a Next MD visit:  N/A   Precautions:  None December 2025 Referral Information:    Date of Evaluation: Req: 5, Auth: 5, Exp: 9/30/2025 07/14/25 POC Auth Visits:  10       Today's Date   7/16/2025    Subjective  Pt states that she had an MRI and an x-ray of her right foot yesterday.       Pain: 6/10     Objective  See treatment log below.  Instructions for HEP.           Assessment  Pt demonstrated good understanding of her HEP.  She was more tender at left plantar fascia than right.  Tolerated ther ex without c/o increased pain.    Goals (to be met in 10 visits)      Not Met Progress Toward Partially Met Met   Pt will improve hip ABD strength to 4+/5 to increase ease with standing and walking. [] [] [] []   Pt will improve functional hip and ankle strength to report ability to ascend/descend 1 flight of stairs reciprocally with use of handrail. [] [] [] []   Pt will demonstrate improved SLS to >10 seconds NIC to promote safety and decrease risk of falls on uneven surfaces such as grass and gravel. [] [] [] []   Pt will improve right ankle strength to 5/5 for improve ankle stability and ability to perform prolonged walking, [] [] [] []   Pt will be independent and compliant with comprehensive HEP to maintain progress achieved in PT. [] [] [] []              Plan  Continue manual treatment for plantar fascia, hip and ankle strengthening and LE stretching.    Treatment Last 4 Visits  Treatment Day: 2       7/14/2025 7/16/2025   LE Treatment   Therapeutic Exercise  Nustep, LE, L3, 5 min  Instructions for HEP: hip flexor stretch (standing), quad stretch prone, piriformis stretch (seated), gastroc stretch  20\" each R/L  Supine clam with red TB 20x  Resisted  ankle inversion Red TB 20x R/L     Manual Therapy  STM Bilat plantar fascia   Therapeutic Activity Pt education regarding eval findings and PT POC  Discussed importance of standing up at least every 20-30 minutes while working      Therapeutic Exercise Minutes  32   Manual Therapy Minutes  12   Therapeutic Activity Minutes 8    Evaluation Minutes 40    Total Time Of Timed Procedures 8 44   Total Time Of Service-Based Procedures 40 0   Total Treatment Time 48 44   HEP  Access Code: [email or web address]  URL: [email or web address]  Date: 07/16/2025  Prepared by: Cecilia Skelton    Exercises  - Seated Piriformis Stretch with Trunk Bend  - 1-2 x daily - 7 x weekly - 1 sets - 3 reps - 20 seconds hold  - Standing Hip Flexor Stretch  - 1-2 x daily - 7 x weekly - 1 sets - 3 reps - 20 seconds hold  - Standing Gastroc Stretch  - 1-2 x daily - 7 x weekly - 1 sets - 3 reps - 20 seconds hold  - Prone Quadriceps Stretch with Strap  - 1-2 x daily - 7 x weekly - 1 sets - 3 reps - 20 seconds hold  - Hooklying Clamshell with Resistance  - 1 x daily - 7 x weekly - 1 sets - 20 reps  - Seated Ankle Inversion with Resistance and Legs Crossed  - 1 x daily - 7 x weekly - 1 sets - 20 reps        HEP  Access Code: [email or web address]  URL: [email or web address]  Date: 07/16/2025  Prepared by: Cecilia Skelton    Exercises  - Seated Piriformis Stretch with Trunk Bend  - 1-2 x daily - 7 x weekly - 1 sets - 3 reps - 20 seconds hold  - Standing Hip Flexor Stretch  - 1-2 x daily - 7 x weekly - 1 sets - 3 reps - 20 seconds hold  - Standing Gastroc Stretch  - 1-2 x daily - 7 x weekly - 1 sets - 3 reps - 20 seconds hold  - Prone Quadriceps Stretch with Strap  - 1-2 x daily - 7 x weekly - 1 sets - 3 reps - 20 seconds hold  - Hooklying Clamshell with Resistance  - 1 x daily - 7 x weekly - 1 sets - 20 reps  - Seated Ankle Inversion with Resistance and Legs Crossed  - 1 x daily - 7 x weekly - 1 sets - 20 reps    Charges     2 Ther ex, 1  Man

## 2025-07-18 ENCOUNTER — TELEPHONE (OUTPATIENT)
Dept: PHYSICAL THERAPY | Facility: HOSPITAL | Age: 40
End: 2025-07-18

## 2025-07-22 ENCOUNTER — APPOINTMENT (OUTPATIENT)
Dept: PHYSICAL THERAPY | Age: 40
End: 2025-07-22
Attending: INTERNAL MEDICINE

## 2025-07-24 ENCOUNTER — APPOINTMENT (OUTPATIENT)
Dept: PHYSICAL THERAPY | Age: 40
End: 2025-07-24
Attending: INTERNAL MEDICINE

## 2025-07-25 ENCOUNTER — TELEPHONE (OUTPATIENT)
Dept: PHYSICAL THERAPY | Facility: HOSPITAL | Age: 40
End: 2025-07-25

## 2025-07-29 ENCOUNTER — OFFICE VISIT (OUTPATIENT)
Dept: PHYSICAL THERAPY | Age: 40
End: 2025-07-29
Attending: INTERNAL MEDICINE
Payer: COMMERCIAL

## 2025-07-29 ENCOUNTER — APPOINTMENT (OUTPATIENT)
Dept: PHYSICAL THERAPY | Age: 40
End: 2025-07-29
Attending: INTERNAL MEDICINE

## 2025-07-29 PROCEDURE — 97140 MANUAL THERAPY 1/> REGIONS: CPT

## 2025-07-29 PROCEDURE — 97110 THERAPEUTIC EXERCISES: CPT

## 2025-07-31 ENCOUNTER — OFFICE VISIT (OUTPATIENT)
Dept: PHYSICAL THERAPY | Age: 40
End: 2025-07-31
Attending: INTERNAL MEDICINE
Payer: COMMERCIAL

## 2025-07-31 ENCOUNTER — APPOINTMENT (OUTPATIENT)
Dept: PHYSICAL THERAPY | Age: 40
End: 2025-07-31
Attending: INTERNAL MEDICINE

## 2025-07-31 PROCEDURE — 97110 THERAPEUTIC EXERCISES: CPT

## 2025-07-31 PROCEDURE — 97140 MANUAL THERAPY 1/> REGIONS: CPT

## 2025-08-12 ENCOUNTER — APPOINTMENT (OUTPATIENT)
Dept: PHYSICAL THERAPY | Age: 40
End: 2025-08-12
Attending: INTERNAL MEDICINE

## 2025-08-13 ENCOUNTER — APPOINTMENT (OUTPATIENT)
Dept: PHYSICAL THERAPY | Age: 40
End: 2025-08-13
Attending: INTERNAL MEDICINE

## 2025-08-14 ENCOUNTER — APPOINTMENT (OUTPATIENT)
Dept: PHYSICAL THERAPY | Age: 40
End: 2025-08-14
Attending: INTERNAL MEDICINE

## 2025-08-15 ENCOUNTER — APPOINTMENT (OUTPATIENT)
Dept: PHYSICAL THERAPY | Age: 40
End: 2025-08-15
Attending: INTERNAL MEDICINE

## 2025-08-18 ENCOUNTER — APPOINTMENT (OUTPATIENT)
Dept: PHYSICAL THERAPY | Age: 40
End: 2025-08-18
Attending: INTERNAL MEDICINE

## 2025-08-19 RX ORDER — VALACYCLOVIR HYDROCHLORIDE 500 MG/1
500 TABLET, FILM COATED ORAL 2 TIMES DAILY
Qty: 6 TABLET | Refills: 0 | Status: SHIPPED | OUTPATIENT
Start: 2025-08-19

## (undated) NOTE — MR AVS SNAPSHOT
[facility]  [address]  [phone]               Thank you for choosing us for your health care visit with DIAMOND Yi.   We are glad to serve you and happy to provide you with this summar ValACYclovir HCl 500 MG Tabs   Take 1 tablet (500 mg total) by mouth daily. Commonly known as:  VALTREX                   Follow-up Instructions     Return if symptoms worsen or fail to improve.          MyChart     Visit [facility]  You can access your [facility] increments are effective and add up over the week   2 ½ hours per week – spread out over time Use a bibi to keep you motivated   Don’t forget strength training with weights and resistance Set goals and track your progress   You don’t need to join a gym.

## (undated) NOTE — Clinical Note
6/7/2017              Kalie  45.         To [employer],    Please assist Xiomara Oneal, 01/01/1985, in obtaining a standing work station.  Sheould like to   alternate between sitting and june

## (undated) NOTE — LETTER
Date & Time: 11/6/2023, 8:49 AM  Patient: Lis Mckeon  Encounter Provider(s):    DAT Vaughn       To Whom It May Concern:    Nori Avila was seen and treated in our department on 11/6/2023. She can return to work with these limitations: please allow her to work from home until 11/09/23 .     If you have any questions or concerns, please do not hesitate to call.        _____________________________  Physician/APC Signature

## (undated) NOTE — LETTER
6/13/2019              [address] [address] [facility] [id number]         To Whom It May Concern,    Kendal Underwood is currently under my medical care. Please provide Justine Terry with a stand up desk at work.   If you have any question